# Patient Record
Sex: MALE | Race: WHITE | Employment: OTHER | URBAN - METROPOLITAN AREA
[De-identification: names, ages, dates, MRNs, and addresses within clinical notes are randomized per-mention and may not be internally consistent; named-entity substitution may affect disease eponyms.]

---

## 2018-09-23 ENCOUNTER — APPOINTMENT (OUTPATIENT)
Dept: CT IMAGING | Age: 83
End: 2018-09-23
Payer: MEDICARE

## 2018-09-23 ENCOUNTER — HOSPITAL ENCOUNTER (EMERGENCY)
Age: 83
Discharge: HOME | End: 2018-09-23
Attending: EMERGENCY MEDICINE
Payer: MEDICARE

## 2018-09-23 VITALS
WEIGHT: 197.2 LBS | RESPIRATION RATE: 18 BRPM | DIASTOLIC BLOOD PRESSURE: 88 MMHG | TEMPERATURE: 98 F | HEART RATE: 80 BPM | SYSTOLIC BLOOD PRESSURE: 126 MMHG | OXYGEN SATURATION: 92 %

## 2018-09-23 DIAGNOSIS — M54.50 ACUTE EXACERBATION OF CHRONIC LOW BACK PAIN: Primary | ICD-10-CM

## 2018-09-23 DIAGNOSIS — G89.29 ACUTE EXACERBATION OF CHRONIC LOW BACK PAIN: Primary | ICD-10-CM

## 2018-09-23 LAB
ANION GAP SERPL CALCULATED.3IONS-SCNC: 15 MMOL/L (ref 3–16)
BASOPHILS ABSOLUTE: 0 K/UL (ref 0–0.2)
BASOPHILS RELATIVE PERCENT: 0.5 %
BILIRUBIN URINE: NEGATIVE
BLOOD, URINE: NEGATIVE
BUN BLDV-MCNC: 23 MG/DL (ref 7–20)
CALCIUM SERPL-MCNC: 9.6 MG/DL (ref 8.3–10.6)
CHLORIDE BLD-SCNC: 113 MMOL/L (ref 99–110)
CLARITY: CLEAR
CO2: 21 MMOL/L (ref 21–32)
COLOR: YELLOW
CREAT SERPL-MCNC: 1.2 MG/DL (ref 0.8–1.3)
EOSINOPHILS ABSOLUTE: 0.1 K/UL (ref 0–0.6)
EOSINOPHILS RELATIVE PERCENT: 0.7 %
GFR AFRICAN AMERICAN: >60
GFR NON-AFRICAN AMERICAN: 57
GLUCOSE BLD-MCNC: 108 MG/DL (ref 70–99)
GLUCOSE URINE: NEGATIVE MG/DL
HCT VFR BLD CALC: 47.4 % (ref 40.5–52.5)
HEMOGLOBIN: 15.6 G/DL (ref 13.5–17.5)
KETONES, URINE: NEGATIVE MG/DL
LEUKOCYTE ESTERASE, URINE: NEGATIVE
LYMPHOCYTES ABSOLUTE: 1 K/UL (ref 1–5.1)
LYMPHOCYTES RELATIVE PERCENT: 8.9 %
MCH RBC QN AUTO: 32.5 PG (ref 26–34)
MCHC RBC AUTO-ENTMCNC: 33 G/DL (ref 31–36)
MCV RBC AUTO: 98.4 FL (ref 80–100)
MICROSCOPIC EXAMINATION: NORMAL
MONOCYTES ABSOLUTE: 1 K/UL (ref 0–1.3)
MONOCYTES RELATIVE PERCENT: 9.2 %
NEUTROPHILS ABSOLUTE: 8.7 K/UL (ref 1.7–7.7)
NEUTROPHILS RELATIVE PERCENT: 80.7 %
NITRITE, URINE: NEGATIVE
PDW BLD-RTO: 12.6 % (ref 12.4–15.4)
PH UA: 5
PLATELET # BLD: 215 K/UL (ref 135–450)
PMV BLD AUTO: 8.3 FL (ref 5–10.5)
POTASSIUM SERPL-SCNC: 4 MMOL/L (ref 3.5–5.1)
PROTEIN UA: NEGATIVE MG/DL
RBC # BLD: 4.82 M/UL (ref 4.2–5.9)
REASON FOR REJECTION: NORMAL
REJECTED TEST: NORMAL
SODIUM BLD-SCNC: 149 MMOL/L (ref 136–145)
SPECIFIC GRAVITY UA: 1.02
URINE REFLEX TO CULTURE: NORMAL
URINE TYPE: NORMAL
UROBILINOGEN, URINE: 1 E.U./DL
WBC # BLD: 10.7 K/UL (ref 4–11)

## 2018-09-23 PROCEDURE — 81003 URINALYSIS AUTO W/O SCOPE: CPT

## 2018-09-23 PROCEDURE — 96374 THER/PROPH/DIAG INJ IV PUSH: CPT

## 2018-09-23 PROCEDURE — 99284 EMERGENCY DEPT VISIT MOD MDM: CPT

## 2018-09-23 PROCEDURE — 96375 TX/PRO/DX INJ NEW DRUG ADDON: CPT

## 2018-09-23 PROCEDURE — 96376 TX/PRO/DX INJ SAME DRUG ADON: CPT

## 2018-09-23 PROCEDURE — 80048 BASIC METABOLIC PNL TOTAL CA: CPT

## 2018-09-23 PROCEDURE — 6360000002 HC RX W HCPCS: Performed by: PHYSICIAN ASSISTANT

## 2018-09-23 PROCEDURE — 85025 COMPLETE CBC W/AUTO DIFF WBC: CPT

## 2018-09-23 PROCEDURE — 72131 CT LUMBAR SPINE W/O DYE: CPT

## 2018-09-23 RX ORDER — MORPHINE SULFATE 4 MG/ML
4 INJECTION, SOLUTION INTRAMUSCULAR; INTRAVENOUS ONCE
Status: COMPLETED | OUTPATIENT
Start: 2018-09-23 | End: 2018-09-23

## 2018-09-23 RX ORDER — MORPHINE SULFATE 4 MG/ML
4 INJECTION, SOLUTION INTRAMUSCULAR; INTRAVENOUS ONCE
Status: DISCONTINUED | OUTPATIENT
Start: 2018-09-23 | End: 2018-09-23

## 2018-09-23 RX ORDER — HYDROCODONE BITARTRATE AND ACETAMINOPHEN 5; 325 MG/1; MG/1
1 TABLET ORAL EVERY 6 HOURS PRN
Qty: 12 TABLET | Refills: 0 | Status: SHIPPED | OUTPATIENT
Start: 2018-09-23 | End: 2018-09-26

## 2018-09-23 RX ORDER — ONDANSETRON 2 MG/ML
4 INJECTION INTRAMUSCULAR; INTRAVENOUS ONCE
Status: COMPLETED | OUTPATIENT
Start: 2018-09-23 | End: 2018-09-23

## 2018-09-23 RX ORDER — MORPHINE SULFATE 2 MG/ML
2 INJECTION, SOLUTION INTRAMUSCULAR; INTRAVENOUS ONCE
Status: COMPLETED | OUTPATIENT
Start: 2018-09-23 | End: 2018-09-23

## 2018-09-23 RX ADMIN — MORPHINE SULFATE 2 MG: 2 INJECTION, SOLUTION INTRAMUSCULAR; INTRAVENOUS at 10:29

## 2018-09-23 RX ADMIN — ONDANSETRON 4 MG: 2 INJECTION INTRAMUSCULAR; INTRAVENOUS at 10:29

## 2018-09-23 RX ADMIN — MORPHINE SULFATE 4 MG: 4 INJECTION INTRAVENOUS at 15:03

## 2018-09-23 ASSESSMENT — PAIN SCALES - GENERAL
PAINLEVEL_OUTOF10: 5
PAINLEVEL_OUTOF10: 5

## 2018-09-23 NOTE — ED PROVIDER NOTES
Degenerative changes at L3-4 and L4-5 contributing to mild spinal canal   stenosis. 3. Subtle soft tissue stranding in the retroperitoneal fat adjacent to the   distal aorta. There is no evidence of wall thickening, aneurysm or rupture. This is a nonspecific finding which can be seen with atherosclerotic disease. Giant cell arteritis would be considered less likely. Discitis would also be   considered unlikely with no known history of infection. LABS:  Labs Reviewed   BASIC METABOLIC PANEL - Abnormal; Notable for the following:        Result Value    Sodium 149 (*)     Chloride 113 (*)     Glucose 108 (*)     BUN 23 (*)     GFR Non- 57 (*)     All other components within normal limits    Narrative:     Performed at:  [facility]  [address] [facility] [place] [address]   Phone [phone]   CBC WITH AUTO DIFFERENTIAL - Abnormal; Notable for the following:     Neutrophils # 8.7 (*)     All other components within normal limits    Narrative:     Performed at:  [facility]  [address] [facility] [place] [address]   Phone [phone]   URINE RT REFLEX TO CULTURE    Narrative:     Performed at:  [facility]  [address] [facility] [place] [address]   Phone [phone]   SPECIMEN REJECTION    Narrative:     Performed at:  [facility]  [address] [facility] [place] [address]   Phone [phone]       All other labs were within normal range or not returned as of this dictation. EMERGENCY DEPARTMENT COURSE and DIFFERENTIAL DIAGNOSIS/MDM:   Vitals:    Vitals:    09/23/18 0917   BP: (!) 156/79   Pulse: 93   Resp: 18   Temp: 98.9 °F (37.2 °C)   TempSrc: Oral   SpO2: 97%   Weight: 197 lb 3.2 oz (89.4 kg)       I saw this patient with Dr. Yun Patel who spent face-to-face time with the patient and agreed with my assessment and plan.   The patient [address] [facility]  [phone]    As needed, If symptoms worsen      DISCHARGE MEDICATIONS:  New Prescriptions    HYDROCODONE-ACETAMINOPHEN (NORCO) 5-325 MG PER TABLET    Take 1 tablet by mouth every 6 hours as needed for Pain for up to 3 days. . Take lowest dose possible to manage pain.        (Please note that portions of this note were completed with a voice recognition program.  Efforts were made to edit the dictations but occasionally words are mis-transcribed.)    DECLAN Awad PA-C  09/23/18 7235

## 2018-09-23 NOTE — ED NOTES
D/C: Order noted for d/c. Pt confirmed d/c paperwork  have correct name. Discharge and education instructions reviewed with patient. Teach-back successful. Pt verbalized understanding and signed d/c papers. Pt denied questions at this time. No acute distress noted. Patient instructed to follow-up as noted - return to emergency department if symptoms worsen. Patient verbalized understanding. Discharged per EDMD with discharge instructions. Pt discharged per to private vehicle. Patient stable upon departure. Thanked patient for choosing [facility] for care.           Yao Michel, RN  09/23/18 3089

## 2018-09-23 NOTE — ED NOTES
Pt straight cathed for urine sample pt tolerated well pt depend saturated pt cleansed and changed at this time pt now clean and dry.       Femi Simpson RN  09/23/18 2746

## 2018-09-23 NOTE — ED NOTES
Pt and wife are new residents to [facility]. Aide came in today and states unable to get pt out of bed. Pt aide from [facility] at bedside states pt may need more skilled care.       Rut Gordon RN  09/23/18 7802

## 2018-09-23 NOTE — ED NOTES
Spoke with lab again at this time about lab draw needed in room 8.        Femi Simpson RN  09/23/18 8076

## 2019-12-09 ENCOUNTER — HOSPITAL ENCOUNTER (EMERGENCY)
Age: 84
Discharge: HOME | End: 2019-12-10
Attending: EMERGENCY MEDICINE
Payer: MEDICARE

## 2019-12-09 ENCOUNTER — APPOINTMENT (OUTPATIENT)
Dept: GENERAL RADIOLOGY | Age: 84
End: 2019-12-09
Payer: MEDICARE

## 2019-12-09 VITALS
RESPIRATION RATE: 19 BRPM | HEART RATE: 71 BPM | SYSTOLIC BLOOD PRESSURE: 126 MMHG | DIASTOLIC BLOOD PRESSURE: 72 MMHG | TEMPERATURE: 97.8 F | OXYGEN SATURATION: 86 % | WEIGHT: 176.37 LBS

## 2019-12-09 DIAGNOSIS — R55 SYNCOPE AND COLLAPSE: Primary | ICD-10-CM

## 2019-12-09 LAB
A/G RATIO: 1.4 (ref 1.1–2.2)
ALBUMIN SERPL-MCNC: 3.6 G/DL (ref 3.4–5)
ALP BLD-CCNC: 72 U/L (ref 40–129)
ALT SERPL-CCNC: 8 U/L (ref 10–40)
ANION GAP SERPL CALCULATED.3IONS-SCNC: 11 MMOL/L (ref 3–16)
AST SERPL-CCNC: 14 U/L (ref 15–37)
BASOPHILS ABSOLUTE: 0 K/UL (ref 0–0.2)
BASOPHILS RELATIVE PERCENT: 0.9 %
BILIRUB SERPL-MCNC: 0.5 MG/DL (ref 0–1)
BUN BLDV-MCNC: 21 MG/DL (ref 7–20)
CALCIUM SERPL-MCNC: 9.5 MG/DL (ref 8.3–10.6)
CHLORIDE BLD-SCNC: 101 MMOL/L (ref 99–110)
CO2: 24 MMOL/L (ref 21–32)
CREAT SERPL-MCNC: 1.1 MG/DL (ref 0.8–1.3)
EKG ATRIAL RATE: 66 BPM
EKG DIAGNOSIS: NORMAL
EKG P AXIS: 46 DEGREES
EKG P-R INTERVAL: 190 MS
EKG Q-T INTERVAL: 406 MS
EKG QRS DURATION: 100 MS
EKG QTC CALCULATION (BAZETT): 425 MS
EKG R AXIS: -21 DEGREES
EKG T AXIS: 92 DEGREES
EKG VENTRICULAR RATE: 66 BPM
EOSINOPHILS ABSOLUTE: 0.2 K/UL (ref 0–0.6)
EOSINOPHILS RELATIVE PERCENT: 3.8 %
GFR AFRICAN AMERICAN: >60
GFR NON-AFRICAN AMERICAN: >60
GLOBULIN: 2.6 G/DL
GLUCOSE BLD-MCNC: 104 MG/DL (ref 70–99)
HCT VFR BLD CALC: 40.3 % (ref 40.5–52.5)
HEMOGLOBIN: 13.6 G/DL (ref 13.5–17.5)
LYMPHOCYTES ABSOLUTE: 0.9 K/UL (ref 1–5.1)
LYMPHOCYTES RELATIVE PERCENT: 18.1 %
MCH RBC QN AUTO: 32.6 PG (ref 26–34)
MCHC RBC AUTO-ENTMCNC: 33.8 G/DL (ref 31–36)
MCV RBC AUTO: 96.4 FL (ref 80–100)
MONOCYTES ABSOLUTE: 0.5 K/UL (ref 0–1.3)
MONOCYTES RELATIVE PERCENT: 9.6 %
NEUTROPHILS ABSOLUTE: 3.3 K/UL (ref 1.7–7.7)
NEUTROPHILS RELATIVE PERCENT: 67.6 %
PDW BLD-RTO: 12.7 % (ref 12.4–15.4)
PLATELET # BLD: 183 K/UL (ref 135–450)
PMV BLD AUTO: 8 FL (ref 5–10.5)
POTASSIUM SERPL-SCNC: 4.1 MMOL/L (ref 3.5–5.1)
PRO-BNP: 291 PG/ML (ref 0–449)
RBC # BLD: 4.18 M/UL (ref 4.2–5.9)
SODIUM BLD-SCNC: 136 MMOL/L (ref 136–145)
TOTAL PROTEIN: 6.2 G/DL (ref 6.4–8.2)
TROPONIN: <0.01 NG/ML
WBC # BLD: 4.8 K/UL (ref 4–11)

## 2019-12-09 PROCEDURE — 85025 COMPLETE CBC W/AUTO DIFF WBC: CPT

## 2019-12-09 PROCEDURE — 84484 ASSAY OF TROPONIN QUANT: CPT

## 2019-12-09 PROCEDURE — 93010 ELECTROCARDIOGRAM REPORT: CPT | Performed by: INTERNAL MEDICINE

## 2019-12-09 PROCEDURE — 83880 ASSAY OF NATRIURETIC PEPTIDE: CPT

## 2019-12-09 PROCEDURE — 36415 COLL VENOUS BLD VENIPUNCTURE: CPT

## 2019-12-09 PROCEDURE — 93005 ELECTROCARDIOGRAM TRACING: CPT | Performed by: EMERGENCY MEDICINE

## 2019-12-09 PROCEDURE — 99284 EMERGENCY DEPT VISIT MOD MDM: CPT

## 2019-12-09 PROCEDURE — 80053 COMPREHEN METABOLIC PANEL: CPT

## 2019-12-09 PROCEDURE — 71046 X-RAY EXAM CHEST 2 VIEWS: CPT

## 2019-12-09 ASSESSMENT — PAIN SCALES - PAIN ASSESSMENT IN ADVANCED DEMENTIA (PAINAD)
NEGVOCALIZATION: 0
FACIALEXPRESSION: 0
BODYLANGUAGE: 0
TOTALSCORE: 0
BREATHING: 0
CONSOLABILITY: 0

## 2019-12-10 VITALS
SYSTOLIC BLOOD PRESSURE: 107 MMHG | OXYGEN SATURATION: 99 % | RESPIRATION RATE: 14 BRPM | TEMPERATURE: 97 F | HEART RATE: 62 BPM | DIASTOLIC BLOOD PRESSURE: 64 MMHG

## 2019-12-10 DIAGNOSIS — Z00.8 ENCOUNTER FOR MEDICAL ASSESSMENT: Primary | ICD-10-CM

## 2019-12-10 PROCEDURE — 99281 EMR DPT VST MAYX REQ PHY/QHP: CPT

## 2019-12-10 ASSESSMENT — ENCOUNTER SYMPTOMS
WHEEZING: 0
SHORTNESS OF BREATH: 0
STRIDOR: 0
APNEA: 0
CHEST TIGHTNESS: 0
COLOR CHANGE: 0
VOMITING: 0
CHOKING: 0
COUGH: 0

## 2019-12-10 ASSESSMENT — PAIN SCALES - GENERAL
PAINLEVEL_OUTOF10: 0
PAINLEVEL_OUTOF10: 0

## 2019-12-23 ENCOUNTER — APPOINTMENT (OUTPATIENT)
Dept: GENERAL RADIOLOGY | Age: 84
End: 2019-12-23
Payer: MEDICARE

## 2019-12-23 ENCOUNTER — HOSPITAL ENCOUNTER (OUTPATIENT)
Age: 84
Setting detail: OBSERVATION
Discharge: NURSING, REHAB OR LONG-TERM CARE | End: 2019-12-24
Attending: EMERGENCY MEDICINE | Admitting: INTERNAL MEDICINE
Payer: MEDICARE

## 2019-12-23 ENCOUNTER — APPOINTMENT (OUTPATIENT)
Dept: CT IMAGING | Age: 84
End: 2019-12-23
Payer: MEDICARE

## 2019-12-23 DIAGNOSIS — R56.9 SEIZURE (HCC): Primary | ICD-10-CM

## 2019-12-23 PROBLEM — R41.82 AMS (ALTERED MENTAL STATUS): Status: ACTIVE | Noted: 2019-12-23

## 2019-12-23 LAB
A/G RATIO: 1.3 (ref 1.1–2.2)
ALBUMIN SERPL-MCNC: 3.5 G/DL (ref 3.4–5)
ALP BLD-CCNC: 77 U/L (ref 40–129)
ALT SERPL-CCNC: 10 U/L (ref 10–40)
ANION GAP SERPL CALCULATED.3IONS-SCNC: 17 MMOL/L (ref 3–16)
AST SERPL-CCNC: 19 U/L (ref 15–37)
BASOPHILS ABSOLUTE: 0 K/UL (ref 0–0.2)
BASOPHILS RELATIVE PERCENT: 0.7 %
BILIRUB SERPL-MCNC: 0.5 MG/DL (ref 0–1)
BILIRUBIN URINE: NEGATIVE
BLOOD, URINE: NEGATIVE
BUN BLDV-MCNC: 18 MG/DL (ref 7–20)
CALCIUM SERPL-MCNC: 9.6 MG/DL (ref 8.3–10.6)
CHLORIDE BLD-SCNC: 103 MMOL/L (ref 99–110)
CLARITY: CLEAR
CO2: 17 MMOL/L (ref 21–32)
COLOR: YELLOW
CREAT SERPL-MCNC: 1 MG/DL (ref 0.8–1.3)
EKG ATRIAL RATE: 74 BPM
EKG DIAGNOSIS: NORMAL
EKG P AXIS: 45 DEGREES
EKG P-R INTERVAL: 198 MS
EKG Q-T INTERVAL: 380 MS
EKG QRS DURATION: 102 MS
EKG QTC CALCULATION (BAZETT): 421 MS
EKG R AXIS: -20 DEGREES
EKG T AXIS: 99 DEGREES
EKG VENTRICULAR RATE: 74 BPM
EOSINOPHILS ABSOLUTE: 0.2 K/UL (ref 0–0.6)
EOSINOPHILS RELATIVE PERCENT: 3.5 %
GFR AFRICAN AMERICAN: >60
GFR NON-AFRICAN AMERICAN: >60
GLOBULIN: 2.7 G/DL
GLUCOSE BLD-MCNC: 109 MG/DL (ref 70–99)
GLUCOSE URINE: NEGATIVE MG/DL
HCT VFR BLD CALC: 41.2 % (ref 40.5–52.5)
HEMOGLOBIN: 13.8 G/DL (ref 13.5–17.5)
KETONES, URINE: NEGATIVE MG/DL
LEUKOCYTE ESTERASE, URINE: NEGATIVE
LYMPHOCYTES ABSOLUTE: 1.3 K/UL (ref 1–5.1)
LYMPHOCYTES RELATIVE PERCENT: 21 %
MCH RBC QN AUTO: 33 PG (ref 26–34)
MCHC RBC AUTO-ENTMCNC: 33.6 G/DL (ref 31–36)
MCV RBC AUTO: 98.4 FL (ref 80–100)
MICROSCOPIC EXAMINATION: NORMAL
MONOCYTES ABSOLUTE: 0.5 K/UL (ref 0–1.3)
MONOCYTES RELATIVE PERCENT: 8.3 %
NEUTROPHILS ABSOLUTE: 4.1 K/UL (ref 1.7–7.7)
NEUTROPHILS RELATIVE PERCENT: 66.5 %
NITRITE, URINE: NEGATIVE
PDW BLD-RTO: 12.9 % (ref 12.4–15.4)
PH UA: 7 (ref 5–8)
PLATELET # BLD: 174 K/UL (ref 135–450)
PMV BLD AUTO: 8 FL (ref 5–10.5)
POTASSIUM REFLEX MAGNESIUM: 4.2 MMOL/L (ref 3.5–5.1)
PROTEIN UA: NEGATIVE MG/DL
RBC # BLD: 4.19 M/UL (ref 4.2–5.9)
SODIUM BLD-SCNC: 137 MMOL/L (ref 136–145)
SPECIFIC GRAVITY UA: 1.02 (ref 1–1.03)
TOTAL PROTEIN: 6.2 G/DL (ref 6.4–8.2)
TROPONIN: <0.01 NG/ML
URINE REFLEX TO CULTURE: NORMAL
URINE TYPE: NORMAL
UROBILINOGEN, URINE: 1 E.U./DL
WBC # BLD: 6.1 K/UL (ref 4–11)

## 2019-12-23 PROCEDURE — 97162 PT EVAL MOD COMPLEX 30 MIN: CPT

## 2019-12-23 PROCEDURE — 6360000002 HC RX W HCPCS: Performed by: INTERNAL MEDICINE

## 2019-12-23 PROCEDURE — 99285 EMERGENCY DEPT VISIT HI MDM: CPT

## 2019-12-23 PROCEDURE — 93010 ELECTROCARDIOGRAM REPORT: CPT | Performed by: INTERNAL MEDICINE

## 2019-12-23 PROCEDURE — 84484 ASSAY OF TROPONIN QUANT: CPT

## 2019-12-23 PROCEDURE — 81003 URINALYSIS AUTO W/O SCOPE: CPT

## 2019-12-23 PROCEDURE — 2580000003 HC RX 258: Performed by: EMERGENCY MEDICINE

## 2019-12-23 PROCEDURE — 96361 HYDRATE IV INFUSION ADD-ON: CPT

## 2019-12-23 PROCEDURE — G0378 HOSPITAL OBSERVATION PER HR: HCPCS

## 2019-12-23 PROCEDURE — 71045 X-RAY EXAM CHEST 1 VIEW: CPT

## 2019-12-23 PROCEDURE — 6370000000 HC RX 637 (ALT 250 FOR IP): Performed by: NURSE PRACTITIONER

## 2019-12-23 PROCEDURE — 96360 HYDRATION IV INFUSION INIT: CPT

## 2019-12-23 PROCEDURE — 96372 THER/PROPH/DIAG INJ SC/IM: CPT

## 2019-12-23 PROCEDURE — 70450 CT HEAD/BRAIN W/O DYE: CPT

## 2019-12-23 PROCEDURE — 2580000003 HC RX 258: Performed by: INTERNAL MEDICINE

## 2019-12-23 PROCEDURE — 80053 COMPREHEN METABOLIC PANEL: CPT

## 2019-12-23 PROCEDURE — 85025 COMPLETE CBC W/AUTO DIFF WBC: CPT

## 2019-12-23 PROCEDURE — 93005 ELECTROCARDIOGRAM TRACING: CPT | Performed by: EMERGENCY MEDICINE

## 2019-12-23 PROCEDURE — 97116 GAIT TRAINING THERAPY: CPT

## 2019-12-23 RX ORDER — FINASTERIDE 5 MG/1
5 TABLET, FILM COATED ORAL DAILY
COMMUNITY

## 2019-12-23 RX ORDER — LEVETIRACETAM 500 MG/1
500 TABLET ORAL 2 TIMES DAILY
Status: DISCONTINUED | OUTPATIENT
Start: 2019-12-23 | End: 2019-12-24 | Stop reason: HOSPADM

## 2019-12-23 RX ORDER — SODIUM CHLORIDE 0.9 % (FLUSH) 0.9 %
10 SYRINGE (ML) INJECTION PRN
Status: DISCONTINUED | OUTPATIENT
Start: 2019-12-23 | End: 2019-12-24 | Stop reason: HOSPADM

## 2019-12-23 RX ORDER — ACETAMINOPHEN 325 MG/1
650 TABLET ORAL EVERY 4 HOURS PRN
Status: DISCONTINUED | OUTPATIENT
Start: 2019-12-23 | End: 2019-12-24 | Stop reason: HOSPADM

## 2019-12-23 RX ORDER — PRAVASTATIN SODIUM 40 MG
40 TABLET ORAL NIGHTLY
COMMUNITY

## 2019-12-23 RX ORDER — POLYETHYLENE GLYCOL 3350 17 G/17G
17 POWDER, FOR SOLUTION ORAL 2 TIMES DAILY PRN
COMMUNITY

## 2019-12-23 RX ORDER — SENNA AND DOCUSATE SODIUM 50; 8.6 MG/1; MG/1
1 TABLET, FILM COATED ORAL DAILY
COMMUNITY
End: 2020-10-12

## 2019-12-23 RX ORDER — ONDANSETRON 2 MG/ML
4 INJECTION INTRAMUSCULAR; INTRAVENOUS EVERY 6 HOURS PRN
Status: DISCONTINUED | OUTPATIENT
Start: 2019-12-23 | End: 2019-12-24 | Stop reason: HOSPADM

## 2019-12-23 RX ORDER — SODIUM CHLORIDE 0.9 % (FLUSH) 0.9 %
10 SYRINGE (ML) INJECTION EVERY 12 HOURS SCHEDULED
Status: DISCONTINUED | OUTPATIENT
Start: 2019-12-23 | End: 2019-12-24 | Stop reason: HOSPADM

## 2019-12-23 RX ORDER — SODIUM CHLORIDE 9 MG/ML
1000 INJECTION, SOLUTION INTRAVENOUS CONTINUOUS
Status: DISCONTINUED | OUTPATIENT
Start: 2019-12-23 | End: 2019-12-23

## 2019-12-23 RX ORDER — LEVOTHYROXINE SODIUM 175 UG/1
175 TABLET ORAL DAILY
COMMUNITY

## 2019-12-23 RX ADMIN — SODIUM CHLORIDE 1000 ML: 9 INJECTION, SOLUTION INTRAVENOUS at 09:33

## 2019-12-23 RX ADMIN — ENOXAPARIN SODIUM 40 MG: 40 INJECTION SUBCUTANEOUS at 21:17

## 2019-12-23 RX ADMIN — LEVETIRACETAM 500 MG: 500 TABLET ORAL at 21:17

## 2019-12-23 RX ADMIN — SODIUM CHLORIDE, PRESERVATIVE FREE 10 ML: 5 INJECTION INTRAVENOUS at 21:18

## 2019-12-23 ASSESSMENT — PAIN SCALES - PAIN ASSESSMENT IN ADVANCED DEMENTIA (PAINAD)
BODYLANGUAGE: 0
CONSOLABILITY: 0
BREATHING: 0
CONSOLABILITY: 0
BODYLANGUAGE: 0
BREATHING: 0
FACIALEXPRESSION: 0
TOTALSCORE: 0
FACIALEXPRESSION: 0
TOTALSCORE: 1
BODYLANGUAGE: 0
TOTALSCORE: 0
BODYLANGUAGE: 1
BREATHING: 0
NEGVOCALIZATION: 0
CONSOLABILITY: 0
TOTALSCORE: 1
CONSOLABILITY: 0
BREATHING: 0
FACIALEXPRESSION: 0
NEGVOCALIZATION: 0
NEGVOCALIZATION: 0
NEGVOCALIZATION: 1
BREATHING: 0
CONSOLABILITY: 0
TOTALSCORE: 0
NEGVOCALIZATION: 0
BODYLANGUAGE: 0
FACIALEXPRESSION: 0
FACIALEXPRESSION: 0

## 2019-12-23 ASSESSMENT — PAIN SCALES - GENERAL: PAINLEVEL_OUTOF10: 1

## 2019-12-23 ASSESSMENT — ENCOUNTER SYMPTOMS
DIARRHEA: 0
NAUSEA: 0
EYE PAIN: 0
SHORTNESS OF BREATH: 0
ABDOMINAL PAIN: 0
EYE DISCHARGE: 0
RHINORRHEA: 0
SORE THROAT: 0
COUGH: 0
VOMITING: 0
BACK PAIN: 0
EYE REDNESS: 0
WHEEZING: 0

## 2019-12-24 VITALS
HEART RATE: 73 BPM | WEIGHT: 176.15 LBS | DIASTOLIC BLOOD PRESSURE: 69 MMHG | RESPIRATION RATE: 12 BRPM | SYSTOLIC BLOOD PRESSURE: 118 MMHG | TEMPERATURE: 97.5 F | HEIGHT: 72 IN | BODY MASS INDEX: 23.86 KG/M2 | OXYGEN SATURATION: 97 %

## 2019-12-24 PROCEDURE — 2580000003 HC RX 258: Performed by: INTERNAL MEDICINE

## 2019-12-24 PROCEDURE — G0378 HOSPITAL OBSERVATION PER HR: HCPCS

## 2019-12-24 PROCEDURE — 96372 THER/PROPH/DIAG INJ SC/IM: CPT

## 2019-12-24 PROCEDURE — 6370000000 HC RX 637 (ALT 250 FOR IP): Performed by: NURSE PRACTITIONER

## 2019-12-24 PROCEDURE — 6360000002 HC RX W HCPCS: Performed by: INTERNAL MEDICINE

## 2019-12-24 RX ORDER — LEVETIRACETAM 500 MG/1
500 TABLET ORAL 2 TIMES DAILY
Qty: 60 TABLET | Refills: 3 | Status: SHIPPED | OUTPATIENT
Start: 2019-12-24 | End: 2020-10-12

## 2019-12-24 RX ADMIN — SODIUM CHLORIDE, PRESERVATIVE FREE 10 ML: 5 INJECTION INTRAVENOUS at 11:54

## 2019-12-24 RX ADMIN — ENOXAPARIN SODIUM 40 MG: 40 INJECTION SUBCUTANEOUS at 11:53

## 2019-12-24 RX ADMIN — LEVETIRACETAM 500 MG: 500 TABLET ORAL at 11:53

## 2019-12-24 ASSESSMENT — PAIN SCALES - PAIN ASSESSMENT IN ADVANCED DEMENTIA (PAINAD)
BODYLANGUAGE: 0
NEGVOCALIZATION: 0
TOTALSCORE: 0
CONSOLABILITY: 0
CONSOLABILITY: 0
TOTALSCORE: 0
BREATHING: 0
TOTALSCORE: 0
TOTALSCORE: 0
FACIALEXPRESSION: 0
BREATHING: 0
BREATHING: 0
BODYLANGUAGE: 0
CONSOLABILITY: 0
FACIALEXPRESSION: 0
BODYLANGUAGE: 0
TOTALSCORE: 0
BREATHING: 0
FACIALEXPRESSION: 0
BODYLANGUAGE: 0
BREATHING: 0
BREATHING: 0
NEGVOCALIZATION: 0
NEGVOCALIZATION: 0
TOTALSCORE: 0
FACIALEXPRESSION: 0
CONSOLABILITY: 0
BREATHING: 0
FACIALEXPRESSION: 0
NEGVOCALIZATION: 0
FACIALEXPRESSION: 0
CONSOLABILITY: 0
NEGVOCALIZATION: 0
CONSOLABILITY: 0
FACIALEXPRESSION: 0
BODYLANGUAGE: 0
NEGVOCALIZATION: 0
NEGVOCALIZATION: 0
BODYLANGUAGE: 0
TOTALSCORE: 0
TOTALSCORE: 0
NEGVOCALIZATION: 0
FACIALEXPRESSION: 0
BREATHING: 0
CONSOLABILITY: 0
CONSOLABILITY: 0
BODYLANGUAGE: 0
BODYLANGUAGE: 0

## 2019-12-24 ASSESSMENT — PAIN SCALES - WONG BAKER
WONGBAKER_NUMERICALRESPONSE: 0
WONGBAKER_NUMERICALRESPONSE: 0

## 2019-12-24 ASSESSMENT — PAIN SCALES - GENERAL: PAINLEVEL_OUTOF10: 0

## 2020-07-04 ENCOUNTER — APPOINTMENT (OUTPATIENT)
Dept: CT IMAGING | Age: 85
End: 2020-07-04
Payer: MEDICARE

## 2020-07-04 ENCOUNTER — HOSPITAL ENCOUNTER (EMERGENCY)
Age: 85
Discharge: HOME | End: 2020-07-04
Attending: STUDENT IN AN ORGANIZED HEALTH CARE EDUCATION/TRAINING PROGRAM
Payer: MEDICARE

## 2020-07-04 ENCOUNTER — APPOINTMENT (OUTPATIENT)
Dept: GENERAL RADIOLOGY | Age: 85
End: 2020-07-04
Payer: MEDICARE

## 2020-07-04 VITALS
SYSTOLIC BLOOD PRESSURE: 146 MMHG | DIASTOLIC BLOOD PRESSURE: 72 MMHG | HEIGHT: 72 IN | OXYGEN SATURATION: 98 % | BODY MASS INDEX: 24.78 KG/M2 | RESPIRATION RATE: 14 BRPM | WEIGHT: 182.98 LBS | TEMPERATURE: 97.3 F | HEART RATE: 69 BPM

## 2020-07-04 LAB
A/G RATIO: 1 (ref 1.1–2.2)
ALBUMIN SERPL-MCNC: 2.6 G/DL (ref 3.4–5)
ALP BLD-CCNC: 52 U/L (ref 40–129)
ALT SERPL-CCNC: 11 U/L (ref 10–40)
ANION GAP SERPL CALCULATED.3IONS-SCNC: 9 MMOL/L (ref 3–16)
AST SERPL-CCNC: 40 U/L (ref 15–37)
BASOPHILS ABSOLUTE: 0 K/UL (ref 0–0.2)
BASOPHILS RELATIVE PERCENT: 1 %
BILIRUB SERPL-MCNC: 0.4 MG/DL (ref 0–1)
BUN BLDV-MCNC: 19 MG/DL (ref 7–20)
CALCIUM SERPL-MCNC: 7.8 MG/DL (ref 8.3–10.6)
CHLORIDE BLD-SCNC: 105 MMOL/L (ref 99–110)
CO2: 16 MMOL/L (ref 21–32)
CREAT SERPL-MCNC: 1.1 MG/DL (ref 0.8–1.3)
EOSINOPHILS ABSOLUTE: 0.1 K/UL (ref 0–0.6)
EOSINOPHILS RELATIVE PERCENT: 2.3 %
GFR AFRICAN AMERICAN: >60
GFR NON-AFRICAN AMERICAN: >60
GLOBULIN: 2.7 G/DL
GLUCOSE BLD-MCNC: 119 MG/DL (ref 70–99)
HCT VFR BLD CALC: 36.7 % (ref 40.5–52.5)
HEMOGLOBIN: 12.3 G/DL (ref 13.5–17.5)
INR BLD: 1.06 (ref 0.86–1.14)
LYMPHOCYTES ABSOLUTE: 0.9 K/UL (ref 1–5.1)
LYMPHOCYTES RELATIVE PERCENT: 19.7 %
MCH RBC QN AUTO: 33 PG (ref 26–34)
MCHC RBC AUTO-ENTMCNC: 33.4 G/DL (ref 31–36)
MCV RBC AUTO: 98.8 FL (ref 80–100)
MONOCYTES ABSOLUTE: 0.4 K/UL (ref 0–1.3)
MONOCYTES RELATIVE PERCENT: 8.4 %
NEUTROPHILS ABSOLUTE: 3.1 K/UL (ref 1.7–7.7)
NEUTROPHILS RELATIVE PERCENT: 68.6 %
PDW BLD-RTO: 13.3 % (ref 12.4–15.4)
PLATELET # BLD: 160 K/UL (ref 135–450)
PMV BLD AUTO: 8.2 FL (ref 5–10.5)
POTASSIUM SERPL-SCNC: 4.7 MMOL/L (ref 3.5–5.1)
PROTHROMBIN TIME: 12.3 SEC (ref 10–13.2)
RBC # BLD: 3.71 M/UL (ref 4.2–5.9)
SODIUM BLD-SCNC: 130 MMOL/L (ref 136–145)
TOTAL PROTEIN: 5.3 G/DL (ref 6.4–8.2)
TROPONIN: <0.01 NG/ML
WBC # BLD: 4.5 K/UL (ref 4–11)

## 2020-07-04 PROCEDURE — 85025 COMPLETE CBC W/AUTO DIFF WBC: CPT

## 2020-07-04 PROCEDURE — 85610 PROTHROMBIN TIME: CPT

## 2020-07-04 PROCEDURE — 99284 EMERGENCY DEPT VISIT MOD MDM: CPT

## 2020-07-04 PROCEDURE — 84132 ASSAY OF SERUM POTASSIUM: CPT

## 2020-07-04 PROCEDURE — 80053 COMPREHEN METABOLIC PANEL: CPT

## 2020-07-04 PROCEDURE — 93005 ELECTROCARDIOGRAM TRACING: CPT | Performed by: STUDENT IN AN ORGANIZED HEALTH CARE EDUCATION/TRAINING PROGRAM

## 2020-07-04 PROCEDURE — 71045 X-RAY EXAM CHEST 1 VIEW: CPT

## 2020-07-04 PROCEDURE — 70498 CT ANGIOGRAPHY NECK: CPT

## 2020-07-04 PROCEDURE — 6360000004 HC RX CONTRAST MEDICATION: Performed by: STUDENT IN AN ORGANIZED HEALTH CARE EDUCATION/TRAINING PROGRAM

## 2020-07-04 PROCEDURE — 84484 ASSAY OF TROPONIN QUANT: CPT

## 2020-07-04 PROCEDURE — 70450 CT HEAD/BRAIN W/O DYE: CPT

## 2020-07-04 RX ADMIN — IOPAMIDOL 75 ML: 755 INJECTION, SOLUTION INTRAVENOUS at 10:18

## 2020-07-04 NOTE — ED NOTES
Bed: B-10  Expected date: 7/4/20  Expected time: 9:35 AM  Means of arrival: [facility]  Comments:  LOC     Kira Capone [facility]  07/04/20 8800

## 2020-07-04 NOTE — ED PROVIDER NOTES
Primary Care Physician: No primary care provider on file. Attending Physician: No att. providers found     History   Chief Complaint   Patient presents with    Loss of Consciousness     Patient was found down in bathroom at home. Squad reports patient was unresponsive on their arrival and had an initial bp of 60 systolic and initial pulse in the 40's. Pt was given approx 600 ml of normal saline in route to department. Patient is awake with eyes open but appears lethargic and is unable to answer any questions at this time. unknow what patients baseline is. Dr Cameron Mckeon at bedside to evaluate patient. HPI   Dae Vitale is a 80 y.o. male with history of Alzheimer, hypertension, hyperlipidemia, dysphagia now for 1 year, with multiple syncope events who lives in his assisted living presents this morning after was found unresponsive in his bedroom. Patient does not talk and would not provide history but per reports his blood pressures were in the low 60s heart rate of 40. Was given a liter of fluids with improvement. I was able to talk to the daughter who stated who provided this information. States that the patient lives with his wife in assisted living facility. Patient is DNI DNR per daughter. Past Medical History:   Diagnosis Date    Alzheimer disease ([facility] [address]     Heart murmur     Hyperlipidemia     Thyroid disease         History reviewed. No pertinent surgical history. History reviewed. No pertinent family history.      Social History     Socioeconomic History    Marital status:      Spouse name: None    Number of children: None    Years of education: None    Highest education level: None   Occupational History    None   Social Needs    Financial resource strain: None    Food insecurity     Worry: None     Inability: None    Transportation needs     Medical: None     Non-medical: None   Tobacco Use    Smoking status: Never Smoker    Smokeless tobacco: Never Used   Substance and Sexual Activity    Alcohol use: No    Drug use: None    Sexual activity: None   Lifestyle    Physical activity     Days per week: None     Minutes per session: None    Stress: None   Relationships    Social connections     Talks on phone: None     Gets together: None     Attends Faith service: None     Active member of club or organization: None     Attends meetings of clubs or organizations: None     Relationship status: None    Intimate partner violence     Fear of current or ex partner: None     Emotionally abused: None     Physically abused: None     Forced sexual activity: None   Other Topics Concern    None   Social History Narrative    None        Review of Systems   10 total systems reviewed and found to be negative unless otherwise noted in HPI     Physical Exam   BP (!) 146/72   Pulse 69   Temp 97.3 °F (36.3 °C) (Axillary)   Resp 14   Ht 6' (1.829 m)   Wt 182 lb 15.7 oz (83 kg)   SpO2 98%   BMI 24.82 kg/m²      CONSTITUTIONAL: A phasic and not following commands  HEAD: atraumatic, normocephalic   EYES: PERRL, No injection, discharge or scleral icterus. ENT: Moist mucous membranes. NECK: Normal ROM, NO LAD   CARDIOVASCULAR: Regular rate and rhythm. No murmurs or gallop. PULMONARY/CHEST: Airway patent. No retractions. Breath sounds clear with good air entry bilaterally. ABDOMEN: Soft, Non-distended  SKIN: Acyanotic, warm, dry   MUSCULOSKELETAL: No swelling, tenderness or deformity   NEUROLOGICAL: Awake and alert but not answering questions pulses intact. Grossly nonfocal   Nursing note and vitals reviewed.      ED Course & Medical Decision Making   Medications   iopamidol (ISOVUE-370) 76 % injection 75 mL (75 mLs Intravenous Given 7/4/20 1018)      Labs Reviewed   CBC WITH AUTO DIFFERENTIAL - Abnormal; Notable for the following components:       Result Value    RBC 3.71 (*)     Hemoglobin 12.3 (*)     Hematocrit 36.7 (*)     Lymphocytes Absolute 0.9 (*)     All other components within normal limits    Narrative:     Performed at:  [facility]  [address] [place] [address]   Phone [phone]   COMPREHENSIVE METABOLIC PANEL W/ REFLEX TO MG FOR LOW K - Abnormal; Notable for the following components:    Sodium 130 (*)     CO2 16 (*)     Glucose 119 (*)     Calcium 7.8 (*)     Total Protein 5.3 (*)     Alb 2.6 (*)     Albumin/Globulin Ratio 1.0 (*)     AST 40 (*)     All other components within normal limits    Narrative:     Performed at:  [facility]  [address] [facility] [address] [place] [employer] [address]   Phone [phone]   TROPONIN    Narrative:     Performed at:  [facility]  [address] [facility] [place] [employer] [employer] [address]   Phone [phone]   PROTIME-INR    Narrative:     Performed at:  [facility]  [address] [facility] [place] [employer] [place] [employer] [address]   Phone [phone]   POTASSIUM    Narrative:     Performed at:  [facility]  [address] [facility] [place] [employer] [address]   Phone [phone]   POCT GLUCOSE      XR CHEST PORTABLE   Final Result   Minimal streaky bibasilar atelectasis. No other significant abnormality. CTA HEAD NECK W CONTRAST   Final Result   1. No acute intracranial abnormality. 2. No large vessel occlusion. 3. Severe chronic frontotemporal atrophy which can be seen with   frontotemporal dementia. Critical results were called by Dr. Dimitry Dalton MD to [address] [place] [address]   on 7/4/2020 at 10:35am.         CT HEAD WO CONTRAST   Final Result   1. No acute intracranial abnormality. 2. No large vessel occlusion. 3. Severe chronic frontotemporal atrophy which can be seen with   frontotemporal dementia.    Critical results were called by Dr. Dimitry Datlon MD to [address] [place] [address]   on 7/4/2020 at 10:35am.            EKG INTERPRETATION:  EKG by my preliminary interpretation shows sinus rhythm with rate of 64, normal axis, normal intervals, with no ST changes indicative of ischemia at this time. PROCEDURES:   Procedures    ASSESSMENT AND PLAN:  Christal Smith is a 80 y.o. male who presents today after a syncope event at his house. Exam patient was a phasic and not responding to any questions. When these findings and his presentation and no further history or information I did activate stroke alert for his aphasia. Labs and scans were obtained and showed no acute findings. During work-up I did contact the patient's daughter who stated to me that patient has been aphasic for 1 year and has had multiple syncope events in the past.  At this time, I informed the stroke team and did withdrew my activation. At this patient's daughter, also contacted the charge nurse and informed her that patient is 80years old and has had syncopes in the past as well as is a phasic and daughters no indication for further testing or admission. Patient was discharged home with family in stable condition. ClINICAL IMPRESSION:  1.  Syncope and collapse          PATIENT REFERRED TO:  [facility]) Pre-Services  [phone]          DISCHARGE MEDICATIONS:  Discharge Medication List as of 7/4/2020  1:02 PM        DISCONTINUED MEDICATIONS:  Discharge Medication List as of 7/4/2020  1:02 PM        DISPOSITION    Discharge home  Estephanie Goodson MD (electronically signed)  7/4/2020  _________________________________________________________________________________________  Amount and/or Complexity of Data Reviewed:  Clinical lab tests: ordered and reviewed   Tests in the radiology section of CPT®: ordered and reviewed   Tests in the medicine section of CPT®: ordered and reviewed   Decide to obtain previous medical records or to obtain history from someone other than the patient: yes  Obtain history from someone other than the patient: yes  Review and summarize past medical records:yes  I looked up the patient in our electronic medical record:yes  Discuss the patient with other providers:yes  Independent visualization of images, tracings, or specimens:yes  Risk of Complications, Morbidity, and/or Mortality:Moderate  Presenting problems: moderate Diagnostic procedures: moderate yes Management options: moderate     Critical Care Attestation   Total critical care time: 15 minutes minimum   Critical care time does not include separately billable procedures and treating other patients. Critical care was necessary to treat or prevent imminent or life-threatening deterioration of the following conditions: Aphasia, stroke protocol activated and case discussed with consultants.       _________________________________________________________________________________________  This record is transcribed utilizing voice recognition technology. There are inherent limitations in this technology. In addition, there may be limitations in editing of this report. If there are any discrepancies, please contact me directly.         Haresh Alonso MD  07/04/20 0410

## 2020-07-06 LAB
EKG ATRIAL RATE: 64 BPM
EKG DIAGNOSIS: NORMAL
EKG P AXIS: 42 DEGREES
EKG P-R INTERVAL: 224 MS
EKG Q-T INTERVAL: 428 MS
EKG QRS DURATION: 100 MS
EKG QTC CALCULATION (BAZETT): 441 MS
EKG R AXIS: -24 DEGREES
EKG T AXIS: 92 DEGREES
EKG VENTRICULAR RATE: 64 BPM

## 2020-07-06 PROCEDURE — 93010 ELECTROCARDIOGRAM REPORT: CPT | Performed by: INTERNAL MEDICINE

## 2020-10-12 ENCOUNTER — APPOINTMENT (OUTPATIENT)
Dept: GENERAL RADIOLOGY | Age: 85
DRG: 312 | End: 2020-10-12
Payer: MEDICARE

## 2020-10-12 ENCOUNTER — HOSPITAL ENCOUNTER (OUTPATIENT)
Age: 85
Setting detail: OBSERVATION
Discharge: NURSING, REHAB OR LONG-TERM CARE | DRG: 312 | End: 2020-10-16
Attending: EMERGENCY MEDICINE | Admitting: HOSPITALIST
Payer: MEDICARE

## 2020-10-12 ENCOUNTER — APPOINTMENT (OUTPATIENT)
Dept: CT IMAGING | Age: 85
DRG: 312 | End: 2020-10-12
Payer: MEDICARE

## 2020-10-12 PROBLEM — R55 SYNCOPE AND COLLAPSE: Status: ACTIVE | Noted: 2020-10-12

## 2020-10-12 LAB
ANION GAP SERPL CALCULATED.3IONS-SCNC: 10 MMOL/L (ref 3–16)
BASOPHILS ABSOLUTE: 0.1 K/UL (ref 0–0.2)
BASOPHILS RELATIVE PERCENT: 0.9 %
BILIRUBIN URINE: NEGATIVE
BLOOD, URINE: ABNORMAL
BUN BLDV-MCNC: 18 MG/DL (ref 7–20)
CALCIUM SERPL-MCNC: 8.6 MG/DL (ref 8.3–10.6)
CHLORIDE BLD-SCNC: 106 MMOL/L (ref 99–110)
CLARITY: ABNORMAL
CO2: 22 MMOL/L (ref 21–32)
COLOR: YELLOW
COMMENT UA: ABNORMAL
CREAT SERPL-MCNC: 1.2 MG/DL (ref 0.8–1.3)
EKG ATRIAL RATE: 56 BPM
EKG DIAGNOSIS: NORMAL
EKG P AXIS: 9 DEGREES
EKG P-R INTERVAL: 214 MS
EKG Q-T INTERVAL: 458 MS
EKG QRS DURATION: 120 MS
EKG QTC CALCULATION (BAZETT): 441 MS
EKG R AXIS: -19 DEGREES
EKG T AXIS: 95 DEGREES
EKG VENTRICULAR RATE: 56 BPM
EOSINOPHILS ABSOLUTE: 0.2 K/UL (ref 0–0.6)
EOSINOPHILS RELATIVE PERCENT: 3.9 %
EPITHELIAL CELLS, UA: 3 /HPF (ref 0–5)
GFR AFRICAN AMERICAN: >60
GFR NON-AFRICAN AMERICAN: 57
GLUCOSE BLD-MCNC: 115 MG/DL (ref 70–99)
GLUCOSE BLD-MCNC: 120 MG/DL (ref 70–99)
GLUCOSE URINE: NEGATIVE MG/DL
HCT VFR BLD CALC: 36.1 % (ref 40.5–52.5)
HEMOGLOBIN: 12 G/DL (ref 13.5–17.5)
KETONES, URINE: NEGATIVE MG/DL
LEUKOCYTE ESTERASE, URINE: ABNORMAL
LYMPHOCYTES ABSOLUTE: 1.4 K/UL (ref 1–5.1)
LYMPHOCYTES RELATIVE PERCENT: 23.3 %
MCH RBC QN AUTO: 32.6 PG (ref 26–34)
MCHC RBC AUTO-ENTMCNC: 33.3 G/DL (ref 31–36)
MCV RBC AUTO: 97.9 FL (ref 80–100)
MICROSCOPIC EXAMINATION: YES
MONOCYTES ABSOLUTE: 0.5 K/UL (ref 0–1.3)
MONOCYTES RELATIVE PERCENT: 8.7 %
NEUTROPHILS ABSOLUTE: 3.7 K/UL (ref 1.7–7.7)
NEUTROPHILS RELATIVE PERCENT: 63.2 %
NITRITE, URINE: NEGATIVE
PDW BLD-RTO: 13.8 % (ref 12.4–15.4)
PERFORMED ON: ABNORMAL
PH UA: 6.5 (ref 5–8)
PLATELET # BLD: 183 K/UL (ref 135–450)
PMV BLD AUTO: 8 FL (ref 5–10.5)
POTASSIUM REFLEX MAGNESIUM: 4 MMOL/L (ref 3.5–5.1)
PROTEIN UA: 30 MG/DL
RBC # BLD: 3.68 M/UL (ref 4.2–5.9)
RBC UA: 13 /HPF (ref 0–4)
SODIUM BLD-SCNC: 138 MMOL/L (ref 136–145)
SPECIFIC GRAVITY UA: 1.01 (ref 1–1.03)
TROPONIN: <0.01 NG/ML
URINE REFLEX TO CULTURE: YES
URINE TYPE: ABNORMAL
UROBILINOGEN, URINE: 1 E.U./DL
WBC # BLD: 5.9 K/UL (ref 4–11)
WBC UA: >900 /HPF (ref 0–5)

## 2020-10-12 PROCEDURE — 93010 ELECTROCARDIOGRAM REPORT: CPT | Performed by: INTERNAL MEDICINE

## 2020-10-12 PROCEDURE — 6370000000 HC RX 637 (ALT 250 FOR IP): Performed by: HOSPITALIST

## 2020-10-12 PROCEDURE — G0378 HOSPITAL OBSERVATION PER HR: HCPCS

## 2020-10-12 PROCEDURE — 80048 BASIC METABOLIC PNL TOTAL CA: CPT

## 2020-10-12 PROCEDURE — 84484 ASSAY OF TROPONIN QUANT: CPT

## 2020-10-12 PROCEDURE — 6370000000 HC RX 637 (ALT 250 FOR IP): Performed by: INTERNAL MEDICINE

## 2020-10-12 PROCEDURE — 6360000002 HC RX W HCPCS: Performed by: EMERGENCY MEDICINE

## 2020-10-12 PROCEDURE — 2580000003 HC RX 258: Performed by: HOSPITALIST

## 2020-10-12 PROCEDURE — 93005 ELECTROCARDIOGRAM TRACING: CPT | Performed by: EMERGENCY MEDICINE

## 2020-10-12 PROCEDURE — 81001 URINALYSIS AUTO W/SCOPE: CPT

## 2020-10-12 PROCEDURE — 71045 X-RAY EXAM CHEST 1 VIEW: CPT

## 2020-10-12 PROCEDURE — 96372 THER/PROPH/DIAG INJ SC/IM: CPT

## 2020-10-12 PROCEDURE — 87086 URINE CULTURE/COLONY COUNT: CPT

## 2020-10-12 PROCEDURE — 2580000003 HC RX 258: Performed by: EMERGENCY MEDICINE

## 2020-10-12 PROCEDURE — 6360000002 HC RX W HCPCS: Performed by: HOSPITALIST

## 2020-10-12 PROCEDURE — 1200000000 HC SEMI PRIVATE

## 2020-10-12 PROCEDURE — 70450 CT HEAD/BRAIN W/O DYE: CPT

## 2020-10-12 PROCEDURE — 85025 COMPLETE CBC W/AUTO DIFF WBC: CPT

## 2020-10-12 PROCEDURE — 96365 THER/PROPH/DIAG IV INF INIT: CPT

## 2020-10-12 PROCEDURE — 99285 EMERGENCY DEPT VISIT HI MDM: CPT

## 2020-10-12 RX ORDER — POTASSIUM CHLORIDE 7.45 MG/ML
10 INJECTION INTRAVENOUS PRN
Status: DISCONTINUED | OUTPATIENT
Start: 2020-10-12 | End: 2020-10-16 | Stop reason: HOSPADM

## 2020-10-12 RX ORDER — POLYETHYLENE GLYCOL 3350 17 G/17G
17 POWDER, FOR SOLUTION ORAL 2 TIMES DAILY PRN
Status: DISCONTINUED | OUTPATIENT
Start: 2020-10-12 | End: 2020-10-16 | Stop reason: HOSPADM

## 2020-10-12 RX ORDER — ASPIRIN 81 MG/1
81 TABLET ORAL DAILY
COMMUNITY

## 2020-10-12 RX ORDER — SODIUM CHLORIDE 0.9 % (FLUSH) 0.9 %
10 SYRINGE (ML) INJECTION PRN
Status: DISCONTINUED | OUTPATIENT
Start: 2020-10-12 | End: 2020-10-16 | Stop reason: HOSPADM

## 2020-10-12 RX ORDER — ACETAMINOPHEN 650 MG/1
650 SUPPOSITORY RECTAL EVERY 6 HOURS PRN
Status: DISCONTINUED | OUTPATIENT
Start: 2020-10-12 | End: 2020-10-16 | Stop reason: HOSPADM

## 2020-10-12 RX ORDER — SODIUM CHLORIDE 0.9 % (FLUSH) 0.9 %
10 SYRINGE (ML) INJECTION EVERY 12 HOURS SCHEDULED
Status: DISCONTINUED | OUTPATIENT
Start: 2020-10-12 | End: 2020-10-16 | Stop reason: HOSPADM

## 2020-10-12 RX ORDER — PRAVASTATIN SODIUM 10 MG
40 TABLET ORAL NIGHTLY
Status: DISCONTINUED | OUTPATIENT
Start: 2020-10-12 | End: 2020-10-16 | Stop reason: HOSPADM

## 2020-10-12 RX ORDER — M-VIT,TX,IRON,MINS/CALC/FOLIC 27MG-0.4MG
1 TABLET ORAL DAILY
Status: DISCONTINUED | OUTPATIENT
Start: 2020-10-13 | End: 2020-10-16 | Stop reason: HOSPADM

## 2020-10-12 RX ORDER — SODIUM CHLORIDE 9 MG/ML
INJECTION, SOLUTION INTRAVENOUS CONTINUOUS
Status: DISCONTINUED | OUTPATIENT
Start: 2020-10-12 | End: 2020-10-14

## 2020-10-12 RX ORDER — ONDANSETRON 2 MG/ML
4 INJECTION INTRAMUSCULAR; INTRAVENOUS EVERY 6 HOURS PRN
Status: DISCONTINUED | OUTPATIENT
Start: 2020-10-12 | End: 2020-10-16 | Stop reason: HOSPADM

## 2020-10-12 RX ORDER — ACETAMINOPHEN 325 MG/1
650 TABLET ORAL EVERY 6 HOURS PRN
Status: DISCONTINUED | OUTPATIENT
Start: 2020-10-12 | End: 2020-10-16 | Stop reason: HOSPADM

## 2020-10-12 RX ORDER — M-VIT,TX,IRON,MINS/CALC/FOLIC 27MG-0.4MG
1 TABLET ORAL DAILY
COMMUNITY

## 2020-10-12 RX ORDER — LORATADINE 10 MG/1
10 TABLET ORAL DAILY PRN
COMMUNITY

## 2020-10-12 RX ORDER — VITAMIN B COMPLEX
5000 TABLET ORAL DAILY
Status: DISCONTINUED | OUTPATIENT
Start: 2020-10-13 | End: 2020-10-16 | Stop reason: HOSPADM

## 2020-10-12 RX ORDER — PROMETHAZINE HYDROCHLORIDE 25 MG/1
12.5 TABLET ORAL EVERY 6 HOURS PRN
Status: DISCONTINUED | OUTPATIENT
Start: 2020-10-12 | End: 2020-10-16 | Stop reason: HOSPADM

## 2020-10-12 RX ORDER — FAMOTIDINE 20 MG/1
20 TABLET, FILM COATED ORAL NIGHTLY
Status: DISCONTINUED | OUTPATIENT
Start: 2020-10-12 | End: 2020-10-16 | Stop reason: HOSPADM

## 2020-10-12 RX ORDER — CETIRIZINE HYDROCHLORIDE 10 MG/1
5 TABLET ORAL DAILY
Status: DISCONTINUED | OUTPATIENT
Start: 2020-10-13 | End: 2020-10-16 | Stop reason: HOSPADM

## 2020-10-12 RX ORDER — POTASSIUM CHLORIDE 20 MEQ/1
40 TABLET, EXTENDED RELEASE ORAL PRN
Status: DISCONTINUED | OUTPATIENT
Start: 2020-10-12 | End: 2020-10-16 | Stop reason: HOSPADM

## 2020-10-12 RX ORDER — FAMOTIDINE 20 MG/1
20 TABLET, FILM COATED ORAL 2 TIMES DAILY
Status: DISCONTINUED | OUTPATIENT
Start: 2020-10-12 | End: 2020-10-12 | Stop reason: DRUGHIGH

## 2020-10-12 RX ORDER — POLYETHYLENE GLYCOL 3350 17 G/17G
17 POWDER, FOR SOLUTION ORAL DAILY PRN
Status: DISCONTINUED | OUTPATIENT
Start: 2020-10-12 | End: 2020-10-16 | Stop reason: HOSPADM

## 2020-10-12 RX ORDER — ASPIRIN 81 MG/1
81 TABLET ORAL DAILY
Status: DISCONTINUED | OUTPATIENT
Start: 2020-10-13 | End: 2020-10-16 | Stop reason: HOSPADM

## 2020-10-12 RX ORDER — FINASTERIDE 5 MG/1
5 TABLET, FILM COATED ORAL DAILY
Status: DISCONTINUED | OUTPATIENT
Start: 2020-10-13 | End: 2020-10-16 | Stop reason: HOSPADM

## 2020-10-12 RX ADMIN — ENOXAPARIN SODIUM 40 MG: 40 INJECTION SUBCUTANEOUS at 20:54

## 2020-10-12 RX ADMIN — CEFTRIAXONE 1 G: 1 INJECTION, POWDER, FOR SOLUTION INTRAMUSCULAR; INTRAVENOUS at 12:16

## 2020-10-12 RX ADMIN — FAMOTIDINE 20 MG: 20 TABLET, FILM COATED ORAL at 20:54

## 2020-10-12 RX ADMIN — PRAVASTATIN SODIUM 40 MG: 10 TABLET ORAL at 20:54

## 2020-10-12 RX ADMIN — SODIUM CHLORIDE: 9 INJECTION, SOLUTION INTRAVENOUS at 16:40

## 2020-10-12 ASSESSMENT — PAIN SCALES - GENERAL: PAINLEVEL_OUTOF10: 0

## 2020-10-12 NOTE — H&P
Hospitalist  History and Physical    Patient:  Mitul Spivey  MRN: [id number]  PCP: No primary care provider on file. CHIEF COMPLAINT:  Loss of Consciousness      HISTORY OF PRESENT ILLNESS:   The patient Mitul Spivey is a 80 y.o.male with medical history significant for Alzheimer's disease heart murmur hyperlipidemia and hypothyroidism. Patient presented to the emergency room after having an episode of syncope at home. Patient is nonverbal and is unable to provide any history  Reportedly patient normally ambulates with a walker. He is a resident of extended care facility  He was being assisted out of the shower when he passed out. Patient did not have a seizure disorder    Past Medical History:        Diagnosis Date    Alzheimer disease ([facility] [address]     Heart murmur     Hyperlipidemia     Thyroid disease        Past Surgical History:    History reviewed. No pertinent surgical history. Medications Prior to Admission:    Prior to Admission medications    Medication Sig Start Date End Date Taking?  Authorizing Provider   Multiple Vitamins-Minerals (THERAPEUTIC MULTIVITAMIN-MINERALS) tablet Take 1 tablet by mouth daily   Yes Historical Provider, MD   aspirin 81 MG EC tablet Take 81 mg by mouth daily   Yes Historical Provider, MD   vitamin D (CHOLECALCIFEROL) 25 MCG (1000 UT) TABS tablet Take 5,000 Units by mouth daily   Yes Historical Provider, MD   loratadine (CLARITIN) 10 MG tablet Take 10 mg by mouth daily as needed (allergies)   Yes Historical Provider, MD   levothyroxine (SYNTHROID) 175 MCG tablet Take 175 mcg by mouth Daily   Yes Historical Provider, MD   pravastatin (PRAVACHOL) 40 MG tablet Take 40 mg by mouth nightly   Yes Historical Provider, MD   polyethylene glycol (MIRALAX) PACK packet Take 17 g by mouth 2 times daily as needed (constipation)    Yes Historical Provider, MD   finasteride (PROSCAR) 5 MG tablet Take 5 mg by mouth daily   Yes Historical Provider, MD       Allergies:  Patient has no known allergies. Social History:   TOBACCO:   reports that he has never smoked. He has never used smokeless tobacco.  ETOH:   reports no history of alcohol use. Family History:   Patient unable to provide any history        REVIEW OF SYSTEMS:     Unable to do review of systems due to patient's mental status      CONSTITUTIONAL:      fatigue, fever, chills or night sweats, recent weight gain, recent wt loss, insomnia,  General weakness, poor appetite, muscle aches and pains    HEAD: headache, dizziness    EYES:      blurriness,  double vision, dryness,  discharge, irritation,diplopia    EARS:      hearing loss, vertigo, ear discharge,  Earache. Ringing in the ears. NOSE:      Rhinorrhea, sneezing, epistaxis. Discharge, sinusitis,     MOUTH/THROAT:         sore throat, mouth ulcers, Hoarseness    RESPIRATORY:        Shortness of breath, wheezing,  cough, sputum, hemoptysis, obstructive sleep apnea,    CARDIOVASCULAR :      chest pain, palpitations, dyspnea on exercise, Lower extrimity edema (swelling),     GASTROINTESTINAL:       Dysphagia, Poor appetite,  Nausea, Vomiting, diarrhea, heartburn, abdominal pain. Blood in the stools, hematemesis. Pain with swallowing, constipation    GENITOURINARY:       Urinary frequency, hesitancy,  urgency, Dysuria, hematuria,  Urinary Incontinence. Urinary Retention. GYNECOLOGICAL: vaginal bleeding , vaginal discharge, menopause    MUSCULOSKELETAL:       joint swelling or stiffness, joint pain, muscle pain, balance problems, low back pain. NEUROLOGICAL:      Gait problems. Tremor. Dizziness. Pain and paresthesias, weakness in extremities.  Seizures, memory loss    PSYCHLOGICAL:        Anxiety, depression    SKIN :      Rashes ulcers, skin color changes, easy bruisability, lymphadenopathy      Physical Exam:      Vitals: /70   Pulse 66   Temp 98.8 °F (37.1 °C) (Rectal)   Resp 18   Wt 188 lb 15 oz (85.7 kg)   SpO2 98%   BMI 25.62 kg/m²     Gen:          Alert statin therapy while in the hospital      DVT and GI prophylaxis      Prior      Joseph Hawk M.D    This note was transcribed using [address] [facility]. Please disregard any translational errors.

## 2020-10-12 NOTE — ED TRIAGE NOTES
Patient admitted to ED via [facility] from [place] after witnessed syncopal episode. Per EMS, patient was being taken out of the shower when he slowly collapsed. He did not fall or hit his head. They stated that his BP was originally 80/50s. Patient has dementia and does not talk per baseline. VS upon admission are stable. Patient is alert. Unable to assess orientation.

## 2020-10-12 NOTE — ED NOTES
Report given to Hallie Ding RN who is covering for hold nurse lunch shift.  Told to call with any questions/clarifications/     Nina Flores RN  10/12/20 1043

## 2020-10-12 NOTE — ED PROVIDER NOTES
[address] [facility]      Pt Name: Bre Finley  MRN: [id number]  [place] 10/6/1929  Date of evaluation: 10/12/2020  Provider: Marge Villagran DO    CHIEF COMPLAINT       Chief Complaint   Patient presents with    Loss of Consciousness     from [address], slowly collapsed while being taken out of the shower, did not hit his head\         HISTORY OF PRESENT ILLNESS   (Location/Symptom, Timing/Onset, Context/Setting, Quality, Duration, Modifying Factors, Severity)  Note limiting factors. Bre Finley is a 80 y.o. male who presents to the emergency department with a complaint of a syncopal episode that occurred prior to arrival.  The patient is a resident of an extended care facility and was being assisted out of the shower when he slowly collapsed and briefly lost consciousness. There was no report of any seizure activity. He did not hit his head. Staff was using a gait belt for assistance. There are no family was at the bedside. The patient is nonverbal.    Historical information is very limited. The patient is not able to provide any information based on his baseline mental status. There is no report of any recent illness, fever, chills, cough, cold symptoms, headache, focal weakness, numbness, chest pain, shortness of breath, abdominal pain, vomiting, diarrhea, aspiration. No report of any recent trauma or injury. Nursing Notes were reviewed. HPI        REVIEW OF SYSTEMS    (2-9 systems for level 4, 10 or more for level 5)       Constitutional: Negative for fever or chills. HENT: Negative for rhinorrhea and sore throat. Eyes: Negative for redness or drainage. Respiratory: Negative for shortness of breath or dyspnea on exertion. Cardiovascular: Negative for chest pain. Gastrointestinal: Negative for abdominal pain. Negative for vomiting or diarrhea. Genitourinary: Negative for flank pain. Negative for dysuria.   Negative for hematuria. Neurological: Negative for headache. Musculoskeletal:  Negative edema. Hematological: Negative for adenopathy. All systems are reviewed and are negative except for those listed above in the history of present illness and ROS. PAST MEDICAL HISTORY     Past Medical History:   Diagnosis Date    Alzheimer disease ([address]     Heart murmur     Hyperlipidemia     Thyroid disease          SURGICAL HISTORY     History reviewed. No pertinent surgical history. CURRENT MEDICATIONS       Previous Medications    ASPIRIN 81 MG EC TABLET    Take 81 mg by mouth daily    FINASTERIDE (PROSCAR) 5 MG TABLET    Take 5 mg by mouth daily    LEVOTHYROXINE (SYNTHROID) 175 MCG TABLET    Take 175 mcg by mouth Daily    LORATADINE (CLARITIN) 10 MG TABLET    Take 10 mg by mouth daily as needed (allergies)    MULTIPLE VITAMINS-MINERALS (THERAPEUTIC MULTIVITAMIN-MINERALS) TABLET    Take 1 tablet by mouth daily    POLYETHYLENE GLYCOL (MIRALAX) PACK PACKET    Take 17 g by mouth 2 times daily as needed (constipation)     PRAVASTATIN (PRAVACHOL) 40 MG TABLET    Take 40 mg by mouth nightly    VITAMIN D (CHOLECALCIFEROL) 25 MCG (1000 UT) TABS TABLET    Take 5,000 Units by mouth daily       ALLERGIES     Patient has no known allergies. FAMILY HISTORY     History reviewed. No pertinent family history.        SOCIAL HISTORY       Social History     Socioeconomic History    Marital status:      Spouse name: None    Number of children: None    Years of education: None    Highest education level: None   Occupational History    None   Social Needs    Financial resource strain: None    Food insecurity     Worry: None     Inability: None    Transportation needs     Medical: None     Non-medical: None   Tobacco Use    Smoking status: Never Smoker    Smokeless tobacco: Never Used   Substance and Sexual Activity    Alcohol use: No    Drug use: None    Sexual activity: None   Lifestyle    Physical activity Days per week: None     Minutes per session: None    Stress: None   Relationships    Social connections     Talks on phone: None     Gets together: None     Attends Buddhism service: None     Active member of club or organization: None     Attends meetings of clubs or organizations: None     Relationship status: None    Intimate partner violence     Fear of current or ex partner: None     Emotionally abused: None     Physically abused: None     Forced sexual activity: None   Other Topics Concern    None   Social History Narrative    None       SCREENINGS             PHYSICAL EXAM    (up to 7 for level 4, 8 or more for level 5)     ED Triage Vitals [10/12/20 0936]   BP Temp Temp Source Pulse Resp SpO2 Height Weight   (!) 108/51 98.8 °F (37.1 °C) Rectal 57 16 95 % -- 188 lb 15 oz (85.7 kg)         Physical Exam   Constitutional: Awake but nonverbal.  Good eye contact. Head: No visible evidence of trauma. Normocephalic. Eyes: Pupils equal and reactive. No photophobia. Conjunctiva normal.    HENT: Oral mucosa moist.  Airway patent. Pharynx without erythema. Nares were clear. No stridor or drooling. Neck:  Soft and supple. Nontender. No point or axial tenderness. Heart:  Regular rate and rhythm. No murmur. Lungs:  Clear to auscultation. No wheezes, rales, or ronchi. No conversational dyspnea or accessory muscle use. Chest: Chest wall non-tender. No evidence of trauma. Abdomen:  Soft, nondistended, bowel sounds present. Nontender. No guarding rigidity or rebound. No masses. Unable to elicit any abdominal discomfort to palpation. Diaper present. Moist with urine. Musculoskeletal: Extremities non-tender with full range of motion. Radial and dorsalis pedis pulses were intact. No calf tenderness erythema or edema. Neurological: Awake. Nonverbal.  Follows some simple commands such as opening his mouth, squeezing hands, etc.   strength equal bilaterally. Uncooperative with pronator drift. Wiggles toes bilaterally. Speech clear. Cranial nerves II-XII intact. No facial droop. No apparent acute focal motor or sensory deficits. Skin: Skin is warm and dry. No rash. Lymphatic:  No lympadenopathy. Psychiatric: Unable to assess. She is nonverbal.        DIAGNOSTIC RESULTS     EKG: All EKG's are interpreted by the Emergency Department Physician who either signs or Co-signs this chart in the absence of a cardiologist.    Sinus bradycardia. Rate 56. NM interval 214 ms. First-degree AV block. QRS duration 120 ms. QTc 441 ms.  R axis -19 degrees. Interventricular conduction delay. No ST elevation. No acute change. RADIOLOGY:   Non-plain film images such as CT, Ultrasound and MRI are read by the radiologist. Plain radiographic images are visualized and preliminarily interpreted by the emergency physician with the below findings:        Interpretation per the Radiologist below, if available at the time of this note:    XR CHEST PORTABLE   Final Result   No acute cardiopulmonary disease. CT Head WO Contrast   Final Result   No hemorrhage or mass identified.       Atrophy and small-vessel ischemic change, similar to prior               ED BEDSIDE ULTRASOUND:   Performed by ED Physician - none    LABS:  Labs Reviewed   CBC WITH AUTO DIFFERENTIAL - Abnormal; Notable for the following components:       Result Value    RBC 3.68 (*)     Hemoglobin 12.0 (*)     Hematocrit 36.1 (*)     All other components within normal limits    Narrative:     Performed at:  [facility]  [address] [address]   Phone [phone]   BASIC METABOLIC PANEL W/ REFLEX TO MG FOR LOW K - Abnormal; Notable for the following components:    Glucose 120 (*)     GFR Non- 57 (*)     All other components within normal limits    Narrative:     Performed at:  [facility]  [address] [place] [address]   Phone [phone]   URINE RT REFLEX TO CULTURE - Abnormal; Notable for the following components:    Clarity, UA TURBID (*)     Blood, Urine SMALL (*)     Protein, UA 30 (*)     Leukocyte Esterase, Urine LARGE (*)     All other components within normal limits    Narrative:     Performed at:  [address] [facility] [address]   Phone [phone]   MICROSCOPIC URINALYSIS - Abnormal; Notable for the following components:    WBC, UA >900 (*)     RBC, UA 13 (*)     All other components within normal limits    Narrative:     Performed at:  [address] [facility] [address]   Phone [phone]   POCT GLUCOSE - Abnormal; Notable for the following components:    POC Glucose 115 (*)     All other components within normal limits    Narrative:     Performed at:  [address] [facility] [address]   Phone [phone]   CULTURE, URINE   TROPONIN    Narrative:     Performed at:  [address] [facility] [address]   Phone [phone]   POCT GLUCOSE       All other labs were within normal range or not returned as of this dictation. EMERGENCY DEPARTMENT COURSE and DIFFERENTIAL DIAGNOSIS/MDM:   Vitals:    Vitals:    10/12/20 1335 10/12/20 1350 10/12/20 1405 10/12/20 1420   BP: (!) 149/79 (!) 148/69 139/66 (!) 153/70   Pulse: 63 61 63 60   Resp: 13 16 13 15   Temp:       TempSrc:       SpO2: 99% 100% 100% 99%   Weight:             MDM      Patient presented with a syncopal episode that occurred prior to arrival.  At the time of my examination he is awake but nonverbal.  He follows some simple commands. This appears to be consistent with his baseline mental status. Accu-Chek is 115. He was sent for CT head without contrast.  No apparent acute focal motor or sensory deficits.       REASSESSMENT          10:43 AM: I spoke with the patient's wife who is now at the bedside. The patient actually lives at an extended care facility in an independent living in an apartment with his wife. He normally ambulates with a walker and requires assistance with bathing and ADLs. She states that this morning she and the home health nurse were assisting him with taking a bath. He was standing after getting out of the shower and they were drying him off when he suddenly became very pale, diaphoretic, and slumped over and became unresponsive. There was no seizure activity. She states that he was unresponsive for approximately 15 minutes and then gradually returned to baseline. She states that he is normally nonverbal as a result of prior \"aphasia\" and also has a history of dementia. She states that this has been a progressive issue. She denies any prior history of stroke or seizure activity. She states that he has had prior episodes where he has passed out in the past but they have been very brief. I did have a discussion with the patient's wife at the bedside regarding [facility] [address]. The patient is DNR CCA. She wishes all measures of care up to the point of cardiac arrest in which case the patient would not want CPR, intubation, or life support measures. 11:46 AM: Laboratory studies were reviewed. Urinalysis reveals large leukocytes with greater than 900 white cells and 13 red cells. Findings are consistent with urinary tract infection. Culture is pending. The patient was treated with Rocephin 1 g IV. Patient does have evidence of a syncopal episode that occurred today. He is hemodynamically stable currently. Mental status has returned to baseline according to his wife is at the bedside. The etiology of his syncope is unclear. To include arrhythmia. No evidence of hypotension at this time. ET head is unremarkable. He will be admitted for further treatment and evaluation.   I was placed to the hospitalist on-call for admission. CRITICAL CARE TIME   Total Critical Care time was 0 minutes, excluding separately reportable procedures. There was a high probability of clinically significant/life threatening deterioration in the patient's condition which required my urgent intervention. CONSULTS:  None    PROCEDURES:  Unless otherwise noted below, none     Procedures        FINAL IMPRESSION      1. Syncope and collapse    2. Urinary tract infection without hematuria, site unspecified          DISPOSITION/PLAN   DISPOSITION        PATIENT REFERRED TO:  No follow-up provider specified. DISCHARGE MEDICATIONS:  New Prescriptions    No medications on file     Controlled Substances Monitoring:     No flowsheet data found. (Please note that portions of this note were completed with a voice recognition program.  Efforts were made to edit the dictations but occasionally words are mis-transcribed. )    Yen Carrillo DO (electronically signed)  Attending Emergency Physician          Sandy Maxwell DO  10/12/20 4215

## 2020-10-12 NOTE — ED NOTES
Bed: RUST  Expected date:   Expected time:   Means of arrival:   Comments:  [facility] [address]  10/12/20 7255

## 2020-10-13 LAB
ANION GAP SERPL CALCULATED.3IONS-SCNC: 8 MMOL/L (ref 3–16)
BUN BLDV-MCNC: 14 MG/DL (ref 7–20)
CALCIUM SERPL-MCNC: 8.9 MG/DL (ref 8.3–10.6)
CHLORIDE BLD-SCNC: 104 MMOL/L (ref 99–110)
CO2: 25 MMOL/L (ref 21–32)
CREAT SERPL-MCNC: 1 MG/DL (ref 0.8–1.3)
GFR AFRICAN AMERICAN: >60
GFR NON-AFRICAN AMERICAN: >60
GLUCOSE BLD-MCNC: 102 MG/DL (ref 70–99)
HCT VFR BLD CALC: 37.8 % (ref 40.5–52.5)
HEMOGLOBIN: 12.9 G/DL (ref 13.5–17.5)
LV EF: 63 %
LVEF MODALITY: NORMAL
MCH RBC QN AUTO: 32.7 PG (ref 26–34)
MCHC RBC AUTO-ENTMCNC: 34.1 G/DL (ref 31–36)
MCV RBC AUTO: 95.8 FL (ref 80–100)
PDW BLD-RTO: 13.6 % (ref 12.4–15.4)
PLATELET # BLD: 204 K/UL (ref 135–450)
PMV BLD AUTO: 7.6 FL (ref 5–10.5)
POTASSIUM REFLEX MAGNESIUM: 3.6 MMOL/L (ref 3.5–5.1)
RBC # BLD: 3.94 M/UL (ref 4.2–5.9)
SODIUM BLD-SCNC: 137 MMOL/L (ref 136–145)
URINE CULTURE, ROUTINE: NORMAL
WBC # BLD: 5.3 K/UL (ref 4–11)

## 2020-10-13 PROCEDURE — 6360000002 HC RX W HCPCS: Performed by: HOSPITALIST

## 2020-10-13 PROCEDURE — 6370000000 HC RX 637 (ALT 250 FOR IP): Performed by: HOSPITALIST

## 2020-10-13 PROCEDURE — 97162 PT EVAL MOD COMPLEX 30 MIN: CPT

## 2020-10-13 PROCEDURE — 6370000000 HC RX 637 (ALT 250 FOR IP): Performed by: INTERNAL MEDICINE

## 2020-10-13 PROCEDURE — 96366 THER/PROPH/DIAG IV INF ADDON: CPT

## 2020-10-13 PROCEDURE — 97530 THERAPEUTIC ACTIVITIES: CPT

## 2020-10-13 PROCEDURE — 85027 COMPLETE CBC AUTOMATED: CPT

## 2020-10-13 PROCEDURE — 97166 OT EVAL MOD COMPLEX 45 MIN: CPT

## 2020-10-13 PROCEDURE — 96372 THER/PROPH/DIAG INJ SC/IM: CPT

## 2020-10-13 PROCEDURE — G0378 HOSPITAL OBSERVATION PER HR: HCPCS

## 2020-10-13 PROCEDURE — 2580000003 HC RX 258: Performed by: HOSPITALIST

## 2020-10-13 PROCEDURE — 36415 COLL VENOUS BLD VENIPUNCTURE: CPT

## 2020-10-13 PROCEDURE — 93306 TTE W/DOPPLER COMPLETE: CPT

## 2020-10-13 PROCEDURE — 80048 BASIC METABOLIC PNL TOTAL CA: CPT

## 2020-10-13 RX ADMIN — SODIUM CHLORIDE: 9 INJECTION, SOLUTION INTRAVENOUS at 02:40

## 2020-10-13 RX ADMIN — CEFTRIAXONE 1 G: 1 INJECTION, POWDER, FOR SOLUTION INTRAMUSCULAR; INTRAVENOUS at 12:31

## 2020-10-13 RX ADMIN — SODIUM CHLORIDE: 9 INJECTION, SOLUTION INTRAVENOUS at 12:31

## 2020-10-13 RX ADMIN — FINASTERIDE 5 MG: 5 TABLET, FILM COATED ORAL at 11:00

## 2020-10-13 RX ADMIN — ASPIRIN 81 MG: 81 TABLET, COATED ORAL at 11:00

## 2020-10-13 RX ADMIN — SODIUM CHLORIDE: 9 INJECTION, SOLUTION INTRAVENOUS at 23:03

## 2020-10-13 RX ADMIN — MULTIPLE VITAMINS W/ MINERALS TAB 1 TABLET: TAB at 11:00

## 2020-10-13 RX ADMIN — PRAVASTATIN SODIUM 40 MG: 10 TABLET ORAL at 20:21

## 2020-10-13 RX ADMIN — CETIRIZINE HYDROCHLORIDE 5 MG: 10 TABLET, FILM COATED ORAL at 11:00

## 2020-10-13 RX ADMIN — LEVOTHYROXINE SODIUM 175 MCG: 0.1 TABLET ORAL at 11:01

## 2020-10-13 RX ADMIN — FAMOTIDINE 20 MG: 20 TABLET, FILM COATED ORAL at 20:20

## 2020-10-13 RX ADMIN — Medication 5000 UNITS: at 11:00

## 2020-10-13 RX ADMIN — ENOXAPARIN SODIUM 40 MG: 40 INJECTION SUBCUTANEOUS at 20:20

## 2020-10-13 RX ADMIN — ACETAMINOPHEN 650 MG: 325 TABLET ORAL at 05:28

## 2020-10-13 RX ADMIN — ACETAMINOPHEN 650 MG: 325 TABLET ORAL at 20:20

## 2020-10-13 NOTE — PROGRESS NOTES
Hospitalist Progress Note  10/13/2020 9:21 AM    PCP: No primary care provider on file. [phone]     Date of Admission: 10/12/2020                                                                                                                     HOSPITAL COURSE    Patient demographics:  The patient  Rafael Peter is a 80 y.o. male     Significant past medical history:   Patient Active Problem List   Diagnosis    AMS (altered mental status)    Syncope and collapse         Presenting symptoms:  Loss of Consciousness    Diagnostic workup:      CONSULTS DURING ADMISSION :   IP CONSULT TO NEUROLOGY      Patient was diagnosed with:        Treatment while inpatient:  Patient presented to the emergency room after having an episode of syncope at home.                                                                                       ----------------------------------------------------------      SUBJECTIVE COMPLAINTS-  Follow up for Syncope     Diet: DIET GENERAL;      OBJECTIVE:   Patient Active Problem List   Diagnosis    AMS (altered mental status)    Syncope and collapse       Allergies  Patient has no known allergies.     Medications    Scheduled Meds:   aspirin  81 mg Oral Daily    finasteride  5 mg Oral Daily    levothyroxine  175 mcg Oral Daily    cetirizine  5 mg Oral Daily    therapeutic multivitamin-minerals  1 tablet Oral Daily    pravastatin  40 mg Oral Nightly    Vitamin D  5,000 Units Oral Daily    sodium chloride flush  10 mL Intravenous 2 times per day    enoxaparin  40 mg Subcutaneous Nightly    famotidine  20 mg Oral Nightly     Continuous Infusions:   sodium chloride 100 mL/hr at 10/13/20 0240     PRN Meds:  polyethylene glycol, sodium chloride flush, potassium chloride **OR** potassium alternative oral replacement **OR** potassium chloride, acetaminophen **OR** acetaminophen, polyethylene glycol, promethazine **OR** ondansetron    Vitals   Vitals /wt   Patient Vitals for the past 8 hrs: BP Temp Temp src Pulse Resp SpO2 Weight   10/13/20 0514 (!) 159/68 97.2 °F (36.2 °C) Oral 73 17 99 % 187 lb 2.7 oz (84.9 kg)        72HR INTAKE/OUTPUT:      Intake/Output Summary (Last 24 hours) at 10/13/2020 0921  Last data filed at 10/13/2020 0514  Gross per 24 hour   Intake 1305.51 ml   Output --   Net 1305.51 ml       Exam:    Gen:   Alert and oriented × patient is nonverbal  Eyes: PERRL. No sclera icterus. No conjunctival injection. ENT: No discharge. Pharynx clear. External appearance of ears and nose normal.  Neck: Trachea midline. No obvious mass. Resp: No accessory muscle use. No crackles. No wheezes. No rhonchi. CV: Regular rate. Regular rhythm. No murmur or rub. No edema. GI: Non-tender. Non-distended. No hernia. Skin: Warm, dry, normal texture and turgor. Lymph: No cervical LAD. No supraclavicular LAD. M/S: / Ext. No cyanosis. No clubbing. No joint deformity. Neuro: CN 2-12 are intact,  no neurologic deficits noted. PT/INR: No results for input(s): PROTIME, INR in the last 72 hours. APTT: No results for input(s): APTT in the last 72 hours. CBC:   Recent Labs     10/12/20  0953 10/13/20  0709   WBC 5.9 5.3   HGB 12.0* 12.9*   HCT 36.1* 37.8*   MCV 97.9 95.8    204       BMP:   Recent Labs     10/12/20  0953 10/13/20  0709    137   K 4.0 3.6    104   CO2 22 25   BUN 18 14   CREATININE 1.2 1.0       LIVER PROFILE: No results for input(s): ALKPHOS, AST, ALT, ALB, BILIDIR, BILITOT, ALKPHOS in the last 72 hours. No results for input(s): AMYLASE in the last 72 hours. No results for input(s): LIPASE in the last 72 hours. UA:  Recent Labs     10/12/20  1103   WBCUA >900*   RBCUA 13*       TROPONIN:   Recent Labs     10/12/20  0953   TROPONINI <0.01       Lab Results   Component Value Date/Time    URRFLXCULT Yes 10/12/2020 11:03 AM       No results for input(s): TSHREFLEX in the last 72 hours.     No components found for: [id number]  POC GLUCOSE:    Recent Labs

## 2020-10-13 NOTE — PROGRESS NOTES
Physical Therapy    [facility]  Initial Assessment    NAME: Ismael Lyons  : 10/6/1929  MRN: [id number]    Date of Service: 10/13/2020    Discharge Recommendations:  3-5 sessions per week, Patient would benefit from continued therapy after discharge   PT Equipment Recommendations  Equipment Needed: No  Other: defer to next level of care     Ismael Lyons scored a 9/24 on the AM-PAC short mobility form. Current research shows that an AM-PAC score of 17 or less is typically not associated with a discharge to the patient's home setting. Based on the patient's AM-PAC score and their current functional mobility deficits, it is recommended that the patient have 3-5 sessions per week of Physical Therapy at d/c to increase the patient's independence. Please see assessment section for further patient specific details. If patient discharges prior to next session this note will serve as a discharge summary. Please see below for the latest assessment towards goals. Assessment   Body structures, Functions, Activity limitations: Decreased functional mobility   Assessment: Pt is a 79 y/o male who presented to the ED from [facility] after syncopal episode getting out of the shower. Pt has hx of Alzheimer's disease and is non-verbal.  Per chart, pt lives in assisted living with his wife and has a HHA 3x/day to assist with ADLs, transfers and ambulation. Pt non-verbal so unable to confirm. Today, pt unable to follow commands and required max-total assist of 2 for bed mobility. Session limited by IV site bleeding (pt pulling on IV line and difficulty to re-direct). Pt sat EOB for approx 15 min with CGA-Girma for sitting balance and then too fatigued to attempt standing. However, anticipate that pt would require assist of 2 and LIFT equipment for bed to chair transfer.   Pt is far below his functional baseline and will benefit from continued skilled inpatient PT at a low-moderate intensity upon d/c to decrease burden of care. Will continue to follow. Treatment Diagnosis: functional mobility below baseline  Prognosis: Fair  Decision Making: Medium Complexity  History: Pt is a 81 y/o male who presented to the ED from [address] [place] [address] living after syncopal episode getting out of the shower. Pt has hx of Alzheimer's disease and is non-verbal.  Exam: bed mobility  Clinical Presentation: evolving  PT Education: PT Role  Barriers to Learning: cognition  REQUIRES PT FOLLOW UP: Yes  Activity Tolerance  Activity Tolerance: Patient limited by cognitive status; Patient limited by endurance; Patient limited by fatigue  Activity Tolerance: pt not following commands, pulling on IV line and telemetry throughout session       Patient Diagnosis(es): The primary encounter diagnosis was Syncope and collapse. A diagnosis of Urinary tract infection without hematuria, site unspecified was also pertinent to this visit. has a past medical history of Alzheimer disease ([facility] [address] Heart murmur, Hyperlipidemia, and Thyroid disease. has no past surgical history on file. Restrictions  Restrictions/Precautions  Restrictions/Precautions: Fall Risk  Position Activity Restriction  Other position/activity restrictions: IV, non-verbal     Vision/Hearing  Vision: Within Functional Limits  Hearing: Within functional limits       Subjective  General  Chart Reviewed: Yes  Patient assessed for rehabilitation services?: Yes  Additional Pertinent Hx: Pt is a 81 y/o male who presented to the ED from [address] [place] [address] after syncopal episode getting out of the shower.   Pt has hx of Alzheimer's disease and is non-verbal.  Response To Previous Treatment: Not applicable  Family / Caregiver Present: No  Referring Practitioner: Gregorio Morales MD  Referral Date : 10/13/20  Diagnosis: syncope  Follows Commands: Impaired  Subjective  Subjective: Pt in bed upon arrival.  Non-verbal.  Pt winced in pain during bed mobility but unable to further express pain. Pain Screening  Patient Currently in Pain: Other (comment)(Unable to verbally assess)          Orientation  Orientation  Overall Orientation Status: (unable to assess due to pt being non-verbal)     Social/Functional History  Social/Functional History  Lives With: Spouse  Type of Home: Assisted living([facility])  Home Layout: One level  Home Access: Elevator  Bathroom Shower/Tub: Walk-in shower  Bathroom Toilet: Handicap height(RTS with rails)  Bathroom Equipment: Shower chair, Grab bars in shower, Toilet raiser(pt won't use shower chair)  Bathroom Accessibility: Accessible  Home Equipment: 4 wheeled walker, Lift chair  Receives Help From: Home health(HHA 3x/day for adl's, 2 hours in am, 1-3pm, 7-9 pm)  ADL Assistance: Needs assistance(HHA assists with shower, dressing. Wife or aid assists with toileting (pt also uses depends. Pt feeds himself.)  Homemaking Assistance: Needs assistance(Pt walks to dining room with assist of HHA for meals)  Homemaking Responsibilities: No  Ambulation Assistance: Needs assistance(with [employer])  Transfer Assistance: Needs assistance(HHA assist pt getting in/out of bed, out of chair)  Active : No  Patient's  Info: transportation through [employer] or pt's daughters both live in town  Additional Comments: Pt sleeps in regular bed.   pt non-verbal so above info taken from previous therapy note       Objective          PROM RLE (degrees)  RLE General PROM: unable to assess AROM, pt stiff and resisting movement throughout session  AROM RLE (degrees)  RLE General AROM: unable to assess AROM, pt stiff and resisting movement throughout session  PROM LLE (degrees)  LLE General PROM: unable to assess AROM, pt stiff and resisting movement throughout session  AROM LLE (degrees)  LLE General AROM: unable to assess AROM, pt stiff and resisting movement throughout session  Strength RLE  Comment: unable to assess; pt not following commands and resisting therapist throughout the session        Bed mobility  Rolling to Left: Maximum assistance;2 Person assistance  Rolling to Right: Maximum assistance;2 Person assistance  Supine to Sit: 2 Person assistance;Maximum assistance  Sit to Supine: 2 Person assistance;Maximum assistance  Scooting: Dependent/Total;2 Person assistance  Comment: pt had difficulty following commands and resisted rolling back and forth in bed for bed sheets to be changed     Transfers  Sit to Stand: Unable to assess  Stand to sit: Unable to assess  Bed to Chair: Unable to assess  Comment: pt too fatigued after sitting EOB for approx 15 min to attempt transfers- noted pt's IV site bleeding after supine to sit; RN called to the room and applied pressure and re-wrapped IV while pt sat EOB     Ambulation  Ambulation?: No     Balance  Posture: Fair  Comments: pt required CGA-Girma for sitting balance EOB due to posterior lean; with fatigue leaning on L elbow and did not correct with verbal or tactile cues        Plan   Plan  Times per week: 3-5x/wk  Current Treatment Recommendations: Functional Mobility Training  Safety Devices  Type of devices:  All fall risk precautions in place, Bed alarm in place, Call light within reach, Left in bed, Nurse notified(RN Rajni notified)  Restraints  Initially in place: No    AM-PAC Score  AM-PAC Inpatient Mobility Raw Score : 9 (10/13/20 1623)  AM-PAC Inpatient T-Scale Score : 30.55 (10/13/20 1623)  Mobility Inpatient CMS 0-100% Score: 81.38 (10/13/20 1623)  Mobility Inpatient CMS G-Code Modifier : CM (10/13/20 1623)          Goals  Short term goals  Time Frame for Short term goals: upon d/c  Short term goal 1: bed mobility modA x2  Short term goal 2: transfer to bedside chair via [facility] stedy with assist of 2  Short term goal 3: assess ambulation when appropriate  Patient Goals   Patient goals : pt unable to state goals due to cognition       Therapy Time   Individual Concurrent Group Co-treatment   Time In 1540         Time Out 1615 Minutes 35         Timed Code Treatment Minutes: 20 Minutes         Electronically signed by Nancy Diaz, PT [id number] on 10/13/2020 at 4:30 PM

## 2020-10-13 NOTE — PLAN OF CARE
Problem: Skin Integrity:  Goal: Will show no infection signs and symptoms  Description: Will show no infection signs and symptoms  10/13/2020 1138 by Meseret Velasco RN  Outcome: Ongoing  10/13/2020 0402 by Doris Connelly RN  Outcome: Ongoing  Goal: Absence of new skin breakdown  Description: Absence of new skin breakdown  10/13/2020 1138 by Meseret Velasco RN  Outcome: Ongoing  10/13/2020 0402 by Doris Connelly RN  Outcome: Ongoing     Problem: Falls - Risk of:  Goal: Will remain free from falls  Description: Will remain free from falls  10/13/2020 1138 by Meseret Velasco RN  Outcome: Ongoing  10/13/2020 0402 by Doris Connelly RN  Outcome: Ongoing  Goal: Absence of physical injury  Description: Absence of physical injury  10/13/2020 1138 by Meseret Velasco RN  Outcome: Ongoing  10/13/2020 0402 by Doris Connelly RN  Outcome: Ongoing     Problem: Pain:  Goal: Pain level will decrease  Description: Pain level will decrease  Outcome: Ongoing  Goal: Control of acute pain  Description: Control of acute pain  Outcome: Ongoing  Goal: Control of chronic pain  Description: Control of chronic pain  Outcome: Ongoing

## 2020-10-13 NOTE — PROGRESS NOTES
Occupational Therapy   Occupational Therapy Initial Assessment  Date: 10/13/2020   Patient Name: Vargas San  MRN: [id number]     : 10/6/1929    Date of Service: 10/13/2020    Discharge Recommendations:  3-5 sessions per week, Patient would benefit from continued therapy after discharge  OT Equipment Recommendations  Other: defer to next LOC  Vargas San scored a 9/24 on the AM-PAC ADL Inpatient form. Current research shows that an AM-PAC score of 17 or less is typically not associated with a discharge to the patient's home setting. Based on the patient's AM-PAC score and their current ADL deficits, it is recommended that the patient have 3-5 sessions per week of Occupational Therapy at d/c to increase the patient's independence. Please see assessment section for further patient specific details. If patient discharges prior to next session this note will serve as a discharge summary. Please see below for the latest assessment towards goals. Assessment   Performance deficits / Impairments: Decreased functional mobility ; Decreased ADL status; Decreased ROM; Decreased cognition;Decreased safe awareness;Decreased strength;Decreased endurance;Decreased balance  Assessment: Pt is a 81 yo M under observation following syncopal episode. PMHx: dementia, HLD, hypothyroidism. At baseline, pt is non-verbal and has aide services to assist with all fxl transfers and self-care activities, though reportedly able to ambulate using RW. This date pt required max Ax2 for all aspects of bed mobility, total A for changing his soiled gown. Unsafe to attempt any OOB activity as pt is currently unable to follow any commands and was very fatigued after sitting EOB. Will cont to see pt on acute to address the above deficits and maximize pt's fxl performance. Pt is far below his baseline and will benefit from continued skilled therapy 3-5 times/week at d/c.   Prognosis: Fair  Decision Making: Medium Complexity  History: see above  Exam: cognition, bed mobility, ADLs  Assistance / Modification: max-total Ax2  OT Education: OT Role;Orientation  Barriers to Learning: cognition  REQUIRES OT FOLLOW UP: Yes  Activity Tolerance  Activity Tolerance: Patient limited by fatigue;Treatment limited secondary to decreased cognition  Safety Devices  Safety Devices in place: Yes  Type of devices: Left in bed;Bed alarm in place;Call light within reach;Nurse notified; Patient at risk for falls           Patient Diagnosis(es): The primary encounter diagnosis was Syncope and collapse. A diagnosis of Urinary tract infection without hematuria, site unspecified was also pertinent to this visit. has a past medical history of Alzheimer disease ([facility] [address] Heart murmur, Hyperlipidemia, and Thyroid disease. has no past surgical history on file. Restrictions  Restrictions/Precautions  Restrictions/Precautions: Fall Risk  Position Activity Restriction  Other position/activity restrictions: IV, non-verbal    Subjective   General  Chart Reviewed: Yes  Patient assessed for rehabilitation services?: Yes  Additional Pertinent Hx: Per H&P: \"The patient Mitul Spivey is a 80 y.o.male with medical history significant for Alzheimer's disease heart murmur hyperlipidemia and hypothyroidism. Patient presented to the emergency room after having an episode of syncope at home. Patient is nonverbal and is unable to provide any history. Reportedly patient normally ambulates with a walker. He is a resident of extended care facility. He was being assisted out of the shower when he passed out. Patient did not have a seizure disorder\"  Family / Caregiver Present: No  Referring Practitioner: Wale De Leon  Diagnosis: syncope  Subjective  Subjective: pt met b/s for OT eval/tx with PT. pt nonverbal so unable to assess pain.  pt did wince in pain to R LE during bed mobility     Social/Functional History  Social/Functional History  Lives With: Spouse  Type of Home: Assisted living([facility])  Home Layout: One level  Home Access: Elevator  Bathroom Shower/Tub: Walk-in shower  Bathroom Toilet: Handicap height(RTS with rails)  Bathroom Equipment: Shower chair, Grab bars in shower, Toilet raiser(pt won't use shower chair)  Bathroom Accessibility: Accessible  Home Equipment: 4 wheeled walker, Lift chair  Receives Help From: Home health(HHA 3x/day for adl's, 2 hours in am, 1-3pm, 7-9 pm)  ADL Assistance: Needs assistance(HHA assists with shower, dressing. Wife or aid assists with toileting (pt also uses depends. Pt feeds himself.)  Homemaking Assistance: Needs assistance(Pt walks to dining room with assist of HHA for meals)  Homemaking Responsibilities: No  Ambulation Assistance: Needs assistance(with 2ZG)  Transfer Assistance: Needs assistance(HHA assist pt getting in/out of bed, out of chair)  Active : No  Patient's  Info: transportation through [employer] or pt's daughters both live in town  Additional Comments: Pt sleeps in regular bed.   pt non-verbal so above info taken from previous therapy note       Objective   Vision: Within Functional Limits(unable to assess)  Hearing: Within functional limits(unable to assess)    Orientation  Overall Orientation Status: Impaired(unable to assess, pt nonverbal)     Balance  Sitting Balance: Minimal assistance(CGA-min A, x15 mins EOB with posterior lean)  Standing Balance: Unable to assess(comment)(not safe to attempt at this time)  ADL  UE Dressing: Dependent/Total(to change gown)  Additional Comments: anticipate overall max-total A for all ADLs at this time  Tone RUE  RUE Tone: Normotonic  Tone LUE  LUE Tone: Normotonic  Coordination  Movements Are Fluid And Coordinated: Yes        Transfers  Transfer Comments: unable to assess secondary to pt unable to follow commands to safely attempt, very fatigued after sitting EOB x15 mins     Cognition  Overall Cognitive Status: Exceptions  Arousal/Alertness: Inconsistent responses to stimuli  Following Commands: Does not follow commands  Initiation: Requires cues for all  Sequencing: Requires cues for all  Cognition Comment: pt not following commands, advanced dementia and non-verbal at baseline                 LUE AROM (degrees)  LUE General AROM: unable to assess, pt not following commands and resistant/rigid to any movement  Left Hand AROM (degrees)  Left Hand General AROM: unable to assess, pt not following commands and resistant/rigid to any movement. pt tends to grasp any object in reach so has washcloth in his hand  RUE AROM (degrees)  RUE General AROM: unable to assess, pt not following commands and resistant/rigid to any movement  Right Hand AROM (degrees)  Right Hand General AROM: unable to assess, pt not following commands and resistant/rigid to any movement.  pt tends to grasp any object in reach so has washcloth in his hand  LUE Strength  LUE Strength Comment: unable to assess secondary to pt not following commands, resistant to any movement  RUE Strength  RUE Strength Comment: unable to assess secondary to pt not following commands, resistant to any movement                   Plan   Plan  Times per week: 3-5  Times per day: Daily  Current Treatment Recommendations: Strengthening, Functional Mobility Training, Endurance Training, Balance Training, Safety Education & Training, Self-Care / ADL, ROM, Patient/Caregiver Education & Training      AM-PAC Score        AM-[facility] Inpatient Daily Activity Raw Score: 9 (10/13/20 1629)  AM-PAC Inpatient ADL T-Scale Score : 25.33 (10/13/20 1629)  ADL Inpatient CMS 0-100% Score: 79.59 (10/13/20 1629)  ADL Inpatient CMS G-Code Modifier : CL (10/13/20 1629)    Goals  Short term goals  Time Frame for Short term goals: prior to d/c  Short term goal 1: Pt will complete bed mobility with mod Ax2  Short term goal 2: Pt will complete transfer to bedside chair using jonh stedy lift with mod Ax2  Short term goal 3: Pt will groom with min A  Short term goal 4: Pt will complete x10 reps B UE

## 2020-10-13 NOTE — CARE COORDINATION
10/13  Chart reviewed--noted patient from [address] [place] [address] where he lives with his wife. Per patient's wife- patient ambulated w/ walker prior to admission  Receives assist with bathing and ADLS--has aide services with [employer] 8am-10am  1pm-3pm  7pm-9pm.  Wife hopes for patient to return to apartment at discharge    Spoke w/ Es Moe with [facility] who confirms pt is from [address] [place] [address] at [facility] they would have a skilled bed if needed and also they do use [email or web address] if home care ordered.     Requested PT/OT mason from MD     Electronically signed by Bob Chavez on 10/13/2020 at 12:54 PM

## 2020-10-13 NOTE — CONSULTS
Neurology Consult Note  Reason for Consult: syncope    Chief complaint: limited-patient non verbal     Dr Dru Hannon MD asked me to see Shayy Carolina in consultation for evaluation of syncope    History of Present Illness:  Patient is non verbal at baseline. I obtained my information given conversation with the patients medical team and chart review. Shayy Carolina is a 80 y.o. male with hx of heart murmur, HLD, thyroid disease, alzheimer's dementia who presents for admission on 10/12/20 from nursing facility following a syncopal event. Per conversation with the patients wife Patient reportedly at baseline lives with his wife in independent living at [facility] and is non verbal and ambulates with a walker. She tells me that he has ha hx of vasovagal syncope and has similar events a couple times a month upon standing or when having a bowel movement. On the day of admission the patient was being cleaned up with the aid standing, when he had a \"cold sweat\" , his breathing abnormal as it was a \"raling sound\" which is not typical for his spells with brief loss of consciousness. He did not fall and was assisted to the toilet. He regained consciousness after a few minutes and appeared to be grossly at baseline. Denies any shaking and the patient is incontinent at baseline, uncertain of any tongue trauma. She states that he did have pneumonia several weeks ago and has had some breathing issues at times since then. Upon chart review  3/2019 patient was evaluated at OSH for possible seizures following activity of generalized convulsions that lasted 5-10 minutes. Per wife Jose Enrique Curls was discussed and not started at that time. Any further information pertinent to H&P is limited given patients encephalopathic state. Medical History:  Past Medical History:   Diagnosis Date    Alzheimer disease ([facility] [address]     Heart murmur     Hyperlipidemia     Thyroid disease      History reviewed.  No pertinent surgical history. Scheduled Meds:   aspirin  81 mg Oral Daily    finasteride  5 mg Oral Daily    levothyroxine  175 mcg Oral Daily    cetirizine  5 mg Oral Daily    therapeutic multivitamin-minerals  1 tablet Oral Daily    pravastatin  40 mg Oral Nightly    Vitamin D  5,000 Units Oral Daily    sodium chloride flush  10 mL Intravenous 2 times per day    enoxaparin  40 mg Subcutaneous Nightly    famotidine  20 mg Oral Nightly     Continuous Infusions:   sodium chloride 100 mL/hr at 10/13/20 0240     PRN Meds:.polyethylene glycol, sodium chloride flush, potassium chloride **OR** potassium alternative oral replacement **OR** potassium chloride, acetaminophen **OR** acetaminophen, polyethylene glycol, promethazine **OR** ondansetron    Medications Prior to Admission: Multiple Vitamins-Minerals (THERAPEUTIC MULTIVITAMIN-MINERALS) tablet, Take 1 tablet by mouth daily  aspirin 81 MG EC tablet, Take 81 mg by mouth daily  vitamin D (CHOLECALCIFEROL) 25 MCG (1000 UT) TABS tablet, Take 5,000 Units by mouth daily  loratadine (CLARITIN) 10 MG tablet, Take 10 mg by mouth daily as needed (allergies)  levothyroxine (SYNTHROID) 175 MCG tablet, Take 175 mcg by mouth Daily  pravastatin (PRAVACHOL) 40 MG tablet, Take 40 mg by mouth nightly  polyethylene glycol (MIRALAX) PACK packet, Take 17 g by mouth 2 times daily as needed (constipation)   finasteride (PROSCAR) 5 MG tablet, Take 5 mg by mouth daily    No Known Allergies    History reviewed. No pertinent family history.     Social History     Tobacco Use   Smoking Status Never Smoker   Smokeless Tobacco Never Used     Social History     Substance and Sexual Activity   Drug Use Not on file     Social History     Substance and Sexual Activity   Alcohol Use No       ROS:  Limited given non verbal.     Exam:  Vitals:    10/12/20 1815 10/12/20 1939 10/13/20 0044 10/13/20 0514   BP: (!) 146/82 (!) 143/73 (!) 158/75 (!) 159/68   Pulse: 62 68 65 73   Resp: 15 20 15 17   Temp: 98.1 °F (36.7 °C) 98.3 °F (36.8 °C) 98.5 °F (36.9 °C) 97.2 °F (36.2 °C)   TempSrc: Oral Oral Oral Oral   SpO2: 100% 93% 96% 99%   Weight:    187 lb 2.7 oz (84.9 kg)   Height:          Constitutional    Vital signs: BP, HR, and RR reviewed   General Alert, no distress, well-nourished  Eyes: unable to visualize the fundi  Cardiovascular: pulses symmetric in all 4 extremities. No peripheral edema. Psychiatric: No psychotic behavior noted. Neurologic  Mental status:   orientation limited, non verbal r/t Alzheimer disease. General fund of knowledge:   limited, non verbal r/t Alzheimer disease. Memory:  limited, non verbal r/t Alzheimer disease. Attention  Appear alert. Will track and regard. Language  limited, non verbal r/t Alzheimer disease. Comprehension  limited, non verbal r/t Alzheimer disease. Does not follow commands. Cranial nerves:   CN2: Limited assessment. Visual Fields appear to full as patient blinks to threat bilaterally. CN 3,4,6: extraocular muscles intact. Pupillary light assessment limited given cooperation. CN5: V1: V2: V3: limited given static encephalopathy. CN7: upper and lower facial symmetric. Patient non verbal at baseline. CN8: limited given static encephalopathy. CN9/10: limited given static encephalopathy. CN11: limited given static encephalopathy. CN12: limited given static encephalopathy. Strength: limited given static encephalopathy. BUE: grossly 2/5. Purposeful movement but does not follow. LLE: Minimal distal movement observed. Appears to have left foot drop. 3/5 hip flexion, purposeful. RLE: Purposeful movement. Proximal 3/5, distal 2/5. Deep tendon reflexes:   R Bicep: 3+  R Brachioradialis: 3+  R Patellar: grossly areflexic     L Bicep 3+ L Brachioradialis[de-identified] 3+  L Patellar: grossly areflexic     Sensory: grimaces to pain in 4/4 extremities. Cerebellar/coordination: limited given static encephalopathy. Tone: increased throughout.    Gait: deferred for safety. Labs  Na: 137  K: 3.6  BUN: 14  Creatinine: 1.0  Glucose: 102  Calcium: 8.9    Troponin: <0.01    WBC: 5.3  RBC: 3.94  Hgb: 12.9  Hct: 37.8  Platelets: 357    UA: large leukocyte esterase, negative for nitrites. >900 WBC  Urine Culture: Pending    Studies  CT Head w/o 10/12/20: Reviewed the read. No hemorrhage or mass identified. Atrophy and small-vessel ischemic change, similar to prior     CTA Head & Neck w/ 7/4/20: Reviewed the read. 1. No acute intracranial abnormality. 2. No large vessel occlusion. 3. Severe chronic frontotemporal atrophy which can be seen with frontotemporal dementia. Routine EEG 3/2019: IMPRESSION:   This EEG shows mild to moderate generalized slowing. There is no   evidence of epileptiform activities during the recording. Impression  1. Syncope  2. UTI  3. Alzheimer's Disease. 4. ? LLE weakness/pain  5. HLD  6. HX of vasovagal syncope    Minesh Hanks is a 80 y.o. male with history of alzheimer's dementia, heart murmur, HLD, thyroid disease, vasovagal syncope who presents with syncopal event, different from typical as had \"reported raling breathing sound\" UTI found on admission. Exam limited given end stage alzheimer's, questionable pain/weakness of LLE. Otherwise no gross findings. Etiology: Likely Syncope, cannot fully exclude seizure given hx of described GTC. Recommendations  - No further Neurological work up at this time. - Could trial low dose keppra given multiple recurrent events and hx of GTC type spell. Discussed with wife. - Obtain orthostatics. - Recommend investigation into possible LLE ? weakness/pain per primary team.   - Infectious workup/management per primary team.   - Will discuss case with neurology attending this afternoon.      Nancy Rodriguez, [address] Neurology    A copy of this note was provided for Dr Alec Levy MD

## 2020-10-13 NOTE — PROGRESS NOTES
Occupational Therapy Attempt  Rick Last    OT orders received and chart reviewed. Attempted to see the pt for therapy initial evaluation but was unable to secondary to pt off floor for Echo. Will follow up as schedule permits.      Electronically signed by Xu Carreon OT on 10/13/20 at 2:58 PM EDT

## 2020-10-13 NOTE — PROGRESS NOTES
Physical Therapy  PT attempt  Shayy Carolina  Attempted PT eval and treat this PM.  Pt out of the room at [place]. Will attempt again as schedule permits.     Electronically signed by Kareem Perez, PT [id number] on 10/13/2020 at 2:57 PM

## 2020-10-14 LAB
ANION GAP SERPL CALCULATED.3IONS-SCNC: 10 MMOL/L (ref 3–16)
BUN BLDV-MCNC: 15 MG/DL (ref 7–20)
CALCIUM SERPL-MCNC: 8.6 MG/DL (ref 8.3–10.6)
CHLORIDE BLD-SCNC: 105 MMOL/L (ref 99–110)
CO2: 23 MMOL/L (ref 21–32)
CREAT SERPL-MCNC: 1 MG/DL (ref 0.8–1.3)
GFR AFRICAN AMERICAN: >60
GFR NON-AFRICAN AMERICAN: >60
GLUCOSE BLD-MCNC: 90 MG/DL (ref 70–99)
HCT VFR BLD CALC: 39.1 % (ref 40.5–52.5)
HEMOGLOBIN: 13 G/DL (ref 13.5–17.5)
MCH RBC QN AUTO: 32.3 PG (ref 26–34)
MCHC RBC AUTO-ENTMCNC: 33.3 G/DL (ref 31–36)
MCV RBC AUTO: 97.1 FL (ref 80–100)
PDW BLD-RTO: 13.6 % (ref 12.4–15.4)
PLATELET # BLD: 207 K/UL (ref 135–450)
PMV BLD AUTO: 8.3 FL (ref 5–10.5)
POTASSIUM SERPL-SCNC: 3.9 MMOL/L (ref 3.5–5.1)
RBC # BLD: 4.03 M/UL (ref 4.2–5.9)
SARS-COV-2, NAAT: NOT DETECTED
SODIUM BLD-SCNC: 138 MMOL/L (ref 136–145)
WBC # BLD: 5.6 K/UL (ref 4–11)

## 2020-10-14 PROCEDURE — 80048 BASIC METABOLIC PNL TOTAL CA: CPT

## 2020-10-14 PROCEDURE — G0378 HOSPITAL OBSERVATION PER HR: HCPCS

## 2020-10-14 PROCEDURE — 97530 THERAPEUTIC ACTIVITIES: CPT

## 2020-10-14 PROCEDURE — 96366 THER/PROPH/DIAG IV INF ADDON: CPT

## 2020-10-14 PROCEDURE — 85027 COMPLETE CBC AUTOMATED: CPT

## 2020-10-14 PROCEDURE — U0002 COVID-19 LAB TEST NON-CDC: HCPCS

## 2020-10-14 PROCEDURE — 6370000000 HC RX 637 (ALT 250 FOR IP): Performed by: HOSPITALIST

## 2020-10-14 PROCEDURE — 6370000000 HC RX 637 (ALT 250 FOR IP): Performed by: INTERNAL MEDICINE

## 2020-10-14 PROCEDURE — 94760 N-INVAS EAR/PLS OXIMETRY 1: CPT

## 2020-10-14 PROCEDURE — 2580000003 HC RX 258: Performed by: HOSPITALIST

## 2020-10-14 PROCEDURE — 6360000002 HC RX W HCPCS: Performed by: HOSPITALIST

## 2020-10-14 PROCEDURE — 36415 COLL VENOUS BLD VENIPUNCTURE: CPT

## 2020-10-14 PROCEDURE — 96372 THER/PROPH/DIAG INJ SC/IM: CPT

## 2020-10-14 RX ORDER — CIPROFLOXACIN 500 MG/1
500 TABLET, FILM COATED ORAL 2 TIMES DAILY
Qty: 10 TABLET | Refills: 0
Start: 2020-10-14 | End: 2020-10-16 | Stop reason: SDUPTHER

## 2020-10-14 RX ADMIN — PRAVASTATIN SODIUM 40 MG: 10 TABLET ORAL at 21:54

## 2020-10-14 RX ADMIN — FINASTERIDE 5 MG: 5 TABLET, FILM COATED ORAL at 09:56

## 2020-10-14 RX ADMIN — Medication 5000 UNITS: at 09:56

## 2020-10-14 RX ADMIN — LEVOTHYROXINE SODIUM 175 MCG: 0.1 TABLET ORAL at 06:11

## 2020-10-14 RX ADMIN — SODIUM CHLORIDE, PRESERVATIVE FREE 10 ML: 5 INJECTION INTRAVENOUS at 21:55

## 2020-10-14 RX ADMIN — ASPIRIN 81 MG: 81 TABLET, COATED ORAL at 09:56

## 2020-10-14 RX ADMIN — FAMOTIDINE 20 MG: 20 TABLET, FILM COATED ORAL at 21:54

## 2020-10-14 RX ADMIN — MULTIPLE VITAMINS W/ MINERALS TAB 1 TABLET: TAB at 09:56

## 2020-10-14 RX ADMIN — CEFTRIAXONE 1 G: 1 INJECTION, POWDER, FOR SOLUTION INTRAMUSCULAR; INTRAVENOUS at 11:07

## 2020-10-14 RX ADMIN — ENOXAPARIN SODIUM 40 MG: 40 INJECTION SUBCUTANEOUS at 21:55

## 2020-10-14 RX ADMIN — CETIRIZINE HYDROCHLORIDE 5 MG: 10 TABLET, FILM COATED ORAL at 09:56

## 2020-10-14 NOTE — DISCHARGE INSTR - COC
Continuity of Care Form    Patient Name: Diane Nash   :  10/6/1929  MRN:  [id number]    Admit date:  10/12/2020  Discharge date:  10/14/2020    Code Status Order: Full Code   Advance Directives:      Admitting Physician:  Olena Hernandez MD  PCP: No primary care provider on file. Discharging Nurse: El[facility] Unit/Room#: G9P-4279/9798-16  Discharging Unit Phone Number: [phone]    Emergency Contact:   Extended Emergency Contact Information  Primary Emergency Contact: Yumiko Angulo   [address] Phone: [phone]  Relation: Child  Secondary Emergency Contact: Gavi Harvey  [place] Phone: [phone]  Relation: Spouse    Past Surgical History:  History reviewed. No pertinent surgical history. Immunization History: There is no immunization history on file for this patient.     Active Problems:  Patient Active Problem List   Diagnosis Code    AMS (altered mental status) R41.82    Syncope and collapse R55       Isolation/Infection:   Isolation            No Isolation          Patient Infection Status       None to display            Nurse Assessment:  Last Vital Signs: BP (!) 154/78   Pulse 73   Temp 97.9 °F (36.6 °C)   Resp 18   Ht 6' (1.829 m)   Wt 186 lb 11.7 oz (84.7 kg)   SpO2 100%   BMI 25.33 kg/m²     Last documented pain score (0-10 scale): Pain Level: (facc)  Last Weight:   Wt Readings from Last 1 Encounters:   10/14/20 186 lb 11.7 oz (84.7 kg)     Mental Status:  disoriented and alert    IV Access:  - None    Nursing Mobility/ADLs:  Walking   Dependent  Transfer  Dependent  Bathing  Dependent  Dressing  Dependent  Toileting  Dependent  Feeding  Assisted  Med Admin  Assisted  Med Delivery   crushed and prefers mixed with applesauce- patient tries to chew meds if not crushed    Wound Care Documentation and Therapy:        Elimination:  Continence:   · Bowel: No  · Bladder: No  Urinary Catheter: None   Colostomy/Ileostomy/Ileal Conduit: No Date of Last BM: 10/14    Intake/Output Summary (Last 24 hours) at 10/14/2020 1125  Last data filed at 10/14/2020 1108  Gross per 24 hour   Intake 2855 ml   Output --   Net 2855 ml     I/O last 3 completed shifts: In: 3177 [P.O.:480; I.V.:2255]  Out: -     Safety Concerns: At Risk for Falls and alzheimer's disease    Impairments/Disabilities:      Language Barrier - patient essentially nonverbal, says \"yes and no\" and Hearing    Nutrition Therapy:  Current Nutrition Therapy:   - Oral Diet:  General    Routes of Feeding: Oral  Liquids: No Restrictions  Daily Fluid Restriction: no  Last Modified Barium Swallow with Video (Video Swallowing Test): not done    Treatments at the Time of Hospital Discharge:   Respiratory Treatments: none  Oxygen Therapy:  is not on home oxygen therapy.   Ventilator:    - No ventilator support    Rehab Therapies: Physical Therapy and Occupational Therapy  Weight Bearing Status/Restrictions: No weight bearing restirctions  Other Medical Equipment (for information only, NOT a DME order):  Stedy, maximove/lift  Other Treatments: none    Patient's personal belongings (please select all that are sent with patient):  None    RN SIGNATURE:  Electronically signed by Erica Tomlin RN on 10/14/20 at 11:33 AM EDT    CASE MANAGEMENT/SOCIAL WORK SECTION    Inpatient Status Date: Inpatient 10/15/2020    Readmission Risk Assessment Score:  Readmission Risk              Risk of Unplanned Readmission:        0           Discharging to Facility/ Agency   · Name: [facility]  · Address:  · Phone:[phone]  · [phone]      / signature:Electronically signed by Cardell Hatchet on 10/14/2020 at 11:26 AM     PHYSICIAN SECTION    Prognosis: Good    Condition at Discharge: Stable    Rehab Potential (if transferring to Rehab): Good    Recommended Labs or Other Treatments After Discharge: ot/pt    Physician Certification: I certify the above information and transfer of Ashlyn Bennett Duane Carton  is necessary for the continuing treatment of the diagnosis listed and that he requires [place] for less 30 days.      Update Admission H&P: No change in H&P    PHYSICIAN SIGNATURE:  Electronically signed by Cb Valdivia MD on 10/16/20 at 12:41 PM EDT

## 2020-10-14 NOTE — PROGRESS NOTES
Physical Therapy  Facility/Department: [address] MED SURG  Daily Treatment Note  NAME: Earnest Luciano  : 10/6/1929  MRN: [id number]    Date of Service: 10/14/2020    Discharge Recommendations:  Earnest Luciano scored a  on the AM-PAC short mobility form. Current research shows that an AM-PAC score of 17 or less is typically not associated with a discharge to the patient's home setting. Based on the patient's AM-PAC score and their current functional mobility deficits, it is recommended that the patient have 3-5 sessions per week of Physical Therapy at d/c to increase the patient's independence. Please see assessment section for further patient specific details. If patient discharges prior to next session this note will serve as a discharge summary. Please see below for the latest assessment towards goals. 3-5 sessions per week, Patient would benefit from continued therapy after discharge   PT Equipment Recommendations  Equipment Needed: No  Other: defer to next level of care    Assessment   Body structures, Functions, Activity limitations: Decreased functional mobility   Assessment: Pt requiring totalAx2 for all functional mobility this session due to poor command following and resistance to movement. Pt grabbing onto therapist's arms/hands throughout session, requiring increased time for hand placement and mobility for pericare and brief management. Pt continues to be functioning below his baseline and will continue to benefit from skilled therapy to maximize safety and independence. Pt will likely require increased assist (possibly of 2) and lift equipment for bed to chair transfer. Treatment Diagnosis: functional mobility below baseline  Patient Education: bed mobility  REQUIRES PT FOLLOW UP: Yes  Activity Tolerance  Activity Tolerance: Patient limited by cognitive status; Patient limited by endurance; Patient limited by fatigue  Activity Tolerance: pt continues to have difficulty to command follow, hands/arms during attempts at rolling, requiring increased time for cues and HOHA for hand placement. Pt visibly fearful during mobility, requiring heavy cues and emotional support. TotalA for pericare, brief management, and gown management after being saturated with urine upon arrival.  Transfers  Comment: unsafe to attempt OOB activity, pt resisting rolling to R and L in bed for pericare, unable to command follow for supine to sit transfer  Ambulation  Ambulation?: No                                 G-Code     OutComes Score                                                     AM-PAC Score  AM-PAC Inpatient Mobility Raw Score : 6 (10/14/20 1600)  AM-PAC Inpatient T-Scale Score : 23.55 (10/14/20 1600)  Mobility Inpatient CMS 0-100% Score: 100 (10/14/20 1600)  Mobility Inpatient CMS G-Code Modifier : CN (10/14/20 1600)          Goals  Short term goals  Time Frame for Short term goals: upon d/c -ongoing  Short term goal 1: bed mobility modA x2  Short term goal 2: transfer to bedside chair via [address] [place] [address] stedy with assist of 2  Short term goal 3: assess ambulation when appropriate  Patient Goals   Patient goals : pt unable to state goals due to cognition    Plan    Plan  Times per week: 3-5x/wk  Current Treatment Recommendations: Functional Mobility Training  Safety Devices  Type of devices: All fall risk precautions in place, Bed alarm in place, Call light within reach, Left in bed, Nurse notified  Restraints  Initially in place: No     Therapy Time   Individual Concurrent Group Co-treatment   Time In 1456         Time Out 1520         Minutes 24              Timed Code Treatment Minutes:  24    Total Treatment Minutes:  VERONICA Gayle    This note to serve as discharge summary if patient discharged before next session.

## 2020-10-14 NOTE — PROGRESS NOTES
Hospitalist Progress Note  10/14/2020 9:35 AM    PCP: No primary care provider on file. [phone]     Date of Admission: 10/12/2020                                                                                                                     HOSPITAL COURSE    Patient demographics:  The patient  Izabela Sanchez is a 80 y.o. male     Significant past medical history:   Patient Active Problem List   Diagnosis    AMS (altered mental status)    Syncope and collapse         Presenting symptoms:  Loss of Consciousness    Diagnostic workup:      CONSULTS DURING ADMISSION :   IP CONSULT TO NEUROLOGY      Patient was diagnosed with:  Syncope  Urinary tract infection  Generalized weakness      Treatment while inpatient:  80years old gentleman with history of a aphasia and dementia. Patient presented to the emergency room after having an episode of syncope at home. Wife reports that patient has a history of recurrent syncopes for which exact etiology is not known                 she reports that patient has never been seen with seizure disorder. Patient was diagnosed with urinary tract infection and treated with antibiotics. Patient has urinary incontinence. Patient will be discharged home on 5 days of ciprofloxacin. Patient's echocardiogram showed normal ejection fraction mild aortic stenosis and regurgitation. Patient will be referred to see cardiology as an outpatient.                                                                  ----------------------------------------------------------      SUBJECTIVE COMPLAINTS-  Follow up for Syncope     Diet: DIET GENERAL;      OBJECTIVE:   Patient Active Problem List   Diagnosis    AMS (altered mental status)    Syncope and collapse       Allergies  Patient has no known allergies.     Medications    Scheduled Meds:   aspirin  81 mg Oral Daily    finasteride  5 mg Oral Daily    levothyroxine  175 mcg Oral Daily    cetirizine  5 mg Oral Daily    therapeutic multivitamin-minerals  1 tablet Oral Daily    pravastatin  40 mg Oral Nightly    Vitamin D  5,000 Units Oral Daily    sodium chloride flush  10 mL Intravenous 2 times per day    enoxaparin  40 mg Subcutaneous Nightly    famotidine  20 mg Oral Nightly     Continuous Infusions:   sodium chloride 100 mL/hr at 10/13/20 2303     PRN Meds:  polyethylene glycol, sodium chloride flush, potassium chloride **OR** potassium alternative oral replacement **OR** potassium chloride, acetaminophen **OR** acetaminophen, polyethylene glycol, promethazine **OR** ondansetron    Vitals   Vitals /wt   Patient Vitals for the past 8 hrs:   BP Temp Temp src Pulse Resp SpO2 Weight   10/14/20 0823 -- -- -- -- -- 95 % --   10/14/20 0430 (!) 169/79 97.3 °F (36.3 °C) Oral 68 16 93 % 186 lb 11.7 oz (84.7 kg)        72HR INTAKE/OUTPUT:      Intake/Output Summary (Last 24 hours) at 10/14/2020 0935  Last data filed at 10/14/2020 0430  Gross per 24 hour   Intake 2735 ml   Output --   Net 2735 ml       Exam:    Gen:   Alert and oriented × patient is nonverbal  Eyes: PERRL. No sclera icterus. No conjunctival injection. ENT: No discharge. Pharynx clear. External appearance of ears and nose normal.  Neck: Trachea midline. No obvious mass. Resp: No accessory muscle use. No crackles. No wheezes. No rhonchi. CV: Regular rate. Regular rhythm. No murmur or rub. No edema. GI: Non-tender. Non-distended. No hernia. Skin: Warm, dry, normal texture and turgor. Lymph: No cervical LAD. No supraclavicular LAD. M/S: / Ext. No cyanosis. No clubbing. No joint deformity. Neuro: CN 2-12 are intact,  no neurologic deficits noted. PT/INR: No results for input(s): PROTIME, INR in the last 72 hours. APTT: No results for input(s): APTT in the last 72 hours.     CBC:   Recent Labs     10/12/20  0953 10/13/20  0709 10/14/20  0716   WBC 5.9 5.3 5.6   HGB 12.0* 12.9* 13.0*   HCT 36.1* 37.8* 39.1*   MCV 97.9 95.8 97.1    204 207 BMP:   Recent Labs     10/12/20  0953 10/13/20  0709 10/14/20  0716    137 138   K 4.0 3.6 3.9    104 105   CO2 22 25 23   BUN 18 14 15   CREATININE 1.2 1.0 1.0       LIVER PROFILE: No results for input(s): ALKPHOS, AST, ALT, ALB, BILIDIR, BILITOT, ALKPHOS in the last 72 hours. No results for input(s): AMYLASE in the last 72 hours. No results for input(s): LIPASE in the last 72 hours. UA:  Recent Labs     10/12/20  1103   WBCUA >900*   RBCUA 13*       TROPONIN:   Recent Labs     10/12/20  0953   TROPONINI <0.01       Lab Results   Component Value Date/Time    URRFLXCULT Yes 10/12/2020 11:03 AM       No results for input(s): TSHREFLEX in the last 72 hours. No components found for: [id number]  POC GLUCOSE:    Recent Labs     10/12/20  0957   POCGLU 115*     No results for input(s): LABA1C in the last 72 hours. No results found for: LABA1C      ASSESSMENT AND PLAN  Syncope R55  Likely due to urinary tract infection  Blood pressure is stable  Discontinue  Iv  fluids  Monitor on telemetry  CT scan of the head is negative  Neurology consult is appreciated  Echocardiogram is pending    Urinary tract infection  Continue ceftriaxone pending culture sensitivity   if  No  Growth  On  cultures  Patient  Can  Be  Discharged  On  ciprofloxacin      Hypothyroidism  E03.9  Continue on Synthroid        Hyperlipidemia  E78.5  No current evidence of Rhabdomyolysis or other adverse effects. Continue statin therapy while in the hospital        DVT and GI prophylaxis    OT PT    Code Status: Full Code        Dispo - patient  Waiting  For  precertification      The patient and / or the family were informed of the results of any tests, a time was given to answer questions, a plan was proposed and they agreed with plan. Elizabeth Spears MD    This note was transcribed using [id number] Whi. Please disregard any translational errors.

## 2020-10-14 NOTE — CARE COORDINATION
62/05  Await precert for discharge.   Electronically signed by Filiberto Barth on 10/14/2020 at 4:38 PM

## 2020-10-14 NOTE — CARE COORDINATION
10/14 discharge order - noted PT/OT rec for SNF--spoke w/ wife Shivam Anaya ph# [phone]--DVY is agreeable to SNF at [facility] w/ Amara Chavez at [facility] -#[phone]--TXD will initiate precert. COVID test ordered.   Electronically signed by Tito Luis on 10/14/2020 at 12:04 PM

## 2020-10-14 NOTE — PROGRESS NOTES
Occupational Therapy  Facility/Department: [address] MED SURG  Daily Treatment Note  NAME: Sarah Cook  : 10/6/1929  MRN: [id number]    Date of Service: 10/14/2020    Discharge Recommendations:  3-5 sessions per week, Patient would benefit from continued therapy after discharge       Assessment   Performance deficits / Impairments: Decreased functional mobility ; Decreased ADL status; Decreased ROM; Decreased cognition;Decreased safe awareness;Decreased strength;Decreased endurance;Decreased balance  Assessment: Pt is a 79 yo M under observation following syncopal episode. PMHx: dementia, HLD, hypothyroidism. At baseline, pt is non-verbal and has aide services to assist with all fxl transfers and self-care activities, though reportedly able to ambulate using RW. This date pt required dependence x2 for all aspects of bed mobility, total A for changing his soiled gown, and depends. Unsafe to attempt any OOB activity as pt is currently unable to follow any commands and resistant to movement. Will cont to see pt on acute to address the above deficits and maximize pt's fxl performance. Pt is far below his baseline and will benefit from continued skilled therapy 3-5 times/week at d/c. Prognosis: Fair  REQUIRES OT FOLLOW UP: Yes  Activity Tolerance  Activity Tolerance: Treatment limited secondary to decreased cognition  Safety Devices  Type of devices: Call light within reach; Bed alarm in place; Left in bed         Patient Diagnosis(es): The primary encounter diagnosis was Syncope and collapse. A diagnosis of Urinary tract infection without hematuria, site unspecified was also pertinent to this visit. has a past medical history of Alzheimer disease ([facility] [address] Heart murmur, Hyperlipidemia, and Thyroid disease. has no past surgical history on file.     Restrictions  Restrictions/Precautions  Restrictions/Precautions: Fall Risk  Position Activity Restriction  Other position/activity restrictions: IV, non-verbal  Subjective General  Chart Reviewed: Yes  Patient assessed for rehabilitation services?: Yes  Additional Pertinent Hx: Per H&P: \"The patient Yahir Sousa is a 80 y.o.male with medical history significant for Alzheimer's disease heart murmur hyperlipidemia and hypothyroidism. Patient presented to the emergency room after having an episode of syncope at home. Patient is nonverbal and is unable to provide any history. Reportedly patient normally ambulates with a walker. He is a resident of extended care facility. He was being assisted out of the shower when he passed out. Patient did not have a seizure disorder\"  Family / Caregiver Present: Yes(Pt's wife.)  Referring Practitioner: Thad Reyes  Diagnosis: syncope  Subjective  Subjective: Pt seen bedside with PT for a cotx. Pt nonverbal so unable to assess pain. Objective    ADL  Toileting: Dependent/Total(Pt's sheets were soiled with urine. Pt required dependent x 2 for changing the brief and clean up.)        Functional Mobility  Functional Mobility Comments: Unsafe to attempt at this time as pt is resistant to movement. Bed mobility  Rolling to Left: Dependent/Total;2 Person assistance  Rolling to Right: Dependent/Total;2 Person assistance  Scooting: Dependent/Total;2 Person assistance  Comment: Pt resitant to movements and grabbing for objects in reach with both hands. Pt required assist to roll to both sides to change bed sheets.         Plan   Plan  Times per week: 3-5  Times per day: Daily  Current Treatment Recommendations: Strengthening, Functional Mobility Training, Endurance Training, Balance Training, Safety Education & Training, Self-Care / ADL, ROM, Patient/Caregiver Education & Training    AM-PAC Score        AM-[facility] Inpatient Daily Activity Raw Score: 9 (10/14/20 1530)  AM-PAC Inpatient ADL T-Scale Score : 25.33 (10/14/20 1530)  ADL Inpatient CMS 0-100% Score: 79.59 (10/14/20 1530)  ADL Inpatient CMS G-Code Modifier : CL (10/14/20 1530)    Goals  Short term goals  Time Frame for Short term goals: prior to d/c  Short term goal 1: Pt will complete bed mobility with mod Ax2  Short term goal 2: Pt will complete transfer to bedside chair using jonh stedy lift with mod Ax2  Short term goal 3: Pt will groom with min A  Short term goal 4: Pt will complete x10 reps B UE therex in order to increase strength/endurance for ADLs  Long term goals  Time Frame for Long term goals : LTG=STG  Patient Goals   Patient goals : pt is non-verbal so unable to indicate a goal       Therapy Time   Individual Concurrent Group Co-treatment   Time In 1450         Time Out 1530         Minutes 40              This note to serve as OT d/c summary if pt is d/c-ed from hospital prior to next OT session.       Clive Irving, [address]

## 2020-10-15 PROCEDURE — 1200000000 HC SEMI PRIVATE

## 2020-10-15 PROCEDURE — G0378 HOSPITAL OBSERVATION PER HR: HCPCS

## 2020-10-15 PROCEDURE — 96372 THER/PROPH/DIAG INJ SC/IM: CPT

## 2020-10-15 PROCEDURE — 6370000000 HC RX 637 (ALT 250 FOR IP): Performed by: HOSPITALIST

## 2020-10-15 PROCEDURE — 96366 THER/PROPH/DIAG IV INF ADDON: CPT

## 2020-10-15 PROCEDURE — 6360000002 HC RX W HCPCS: Performed by: HOSPITALIST

## 2020-10-15 PROCEDURE — 6370000000 HC RX 637 (ALT 250 FOR IP): Performed by: INTERNAL MEDICINE

## 2020-10-15 PROCEDURE — 2580000003 HC RX 258: Performed by: HOSPITALIST

## 2020-10-15 PROCEDURE — 94760 N-INVAS EAR/PLS OXIMETRY 1: CPT

## 2020-10-15 RX ORDER — LEVETIRACETAM 5 MG/ML
500 INJECTION INTRAVASCULAR EVERY 12 HOURS
Status: DISCONTINUED | OUTPATIENT
Start: 2020-10-15 | End: 2020-10-15

## 2020-10-15 RX ORDER — LEVETIRACETAM 100 MG/ML
500 SOLUTION ORAL 2 TIMES DAILY
Status: DISCONTINUED | OUTPATIENT
Start: 2020-10-15 | End: 2020-10-16 | Stop reason: HOSPADM

## 2020-10-15 RX ORDER — LEVETIRACETAM 500 MG/1
500 TABLET ORAL 2 TIMES DAILY
Status: DISCONTINUED | OUTPATIENT
Start: 2020-10-15 | End: 2020-10-15 | Stop reason: SDUPTHER

## 2020-10-15 RX ADMIN — SODIUM CHLORIDE, PRESERVATIVE FREE 10 ML: 5 INJECTION INTRAVENOUS at 08:49

## 2020-10-15 RX ADMIN — PRAVASTATIN SODIUM 40 MG: 10 TABLET ORAL at 22:15

## 2020-10-15 RX ADMIN — ENOXAPARIN SODIUM 40 MG: 40 INJECTION SUBCUTANEOUS at 21:50

## 2020-10-15 RX ADMIN — CETIRIZINE HYDROCHLORIDE 5 MG: 10 TABLET, FILM COATED ORAL at 08:42

## 2020-10-15 RX ADMIN — CEFTRIAXONE 1 G: 1 INJECTION, POWDER, FOR SOLUTION INTRAMUSCULAR; INTRAVENOUS at 11:17

## 2020-10-15 RX ADMIN — Medication 5000 UNITS: at 08:42

## 2020-10-15 RX ADMIN — ASPIRIN 81 MG: 81 TABLET, COATED ORAL at 08:42

## 2020-10-15 RX ADMIN — FAMOTIDINE 20 MG: 20 TABLET, FILM COATED ORAL at 21:50

## 2020-10-15 RX ADMIN — LEVOTHYROXINE SODIUM 175 MCG: 0.1 TABLET ORAL at 05:41

## 2020-10-15 RX ADMIN — MULTIPLE VITAMINS W/ MINERALS TAB 1 TABLET: TAB at 08:41

## 2020-10-15 RX ADMIN — FINASTERIDE 5 MG: 5 TABLET, FILM COATED ORAL at 08:42

## 2020-10-15 RX ADMIN — SODIUM CHLORIDE, PRESERVATIVE FREE 10 ML: 5 INJECTION INTRAVENOUS at 22:06

## 2020-10-15 ASSESSMENT — PAIN SCALES - GENERAL: PAINLEVEL_OUTOF10: 0

## 2020-10-15 ASSESSMENT — PAIN SCALES - WONG BAKER
WONGBAKER_NUMERICALRESPONSE: 0
WONGBAKER_NUMERICALRESPONSE: 0

## 2020-10-15 NOTE — CARE COORDINATION
Miguelina spoke with Es Moe at [place] regarding precert--no precert as of yet. Miguelina set up [address] for 6pm, pending precert.      Electronically signed by Bisi Guerrero on 10/15/2020 at 9:50 AM

## 2020-10-15 NOTE — CARE COORDINATION
Sw received call from [facility]. She reported that [facility] has intent to deny skilled level of care. The attending hospital physician can appeal this decision by completing a PEER TO PEER by Jenny Fisher on 10/16/2020--call [phone]. Sw sent message to attending regarding above. Sw explained above to patient's spouse, Davida Garcia ([phone]). Sw reviewed therapy recommendations and that patient was requiring the assist of two for all mobility. Sw discussed options of family caring for patient, private duty 24 hour care and long term care options. Spouse stated that she would need to discuss plans with the family and let Sw know decision on 10/16/20. Sw cancelled [facility]  for 6pm.   Rn aware.      Electronically signed by Dana Rodriguez on 10/15/2020 at 3:47 PM

## 2020-10-15 NOTE — PLAN OF CARE
Problem: Skin Integrity:  Goal: Will show no infection signs and symptoms  Description: Will show no infection signs and symptoms  Outcome: Ongoing     Problem: Skin Integrity:  Goal: Absence of new skin breakdown  Description: Absence of new skin breakdown  Outcome: Ongoing     Problem: Falls - Risk of:  Goal: Will remain free from falls  Description: Will remain free from falls  Outcome: Ongoing     Problem: Falls - Risk of:  Goal: Absence of physical injury  Description: Absence of physical injury  Outcome: Ongoing     Problem: Pain:  Goal: Pain level will decrease  Description: Pain level will decrease  Outcome: Ongoing     Problem: Pain:  Goal: Control of acute pain  Description: Control of acute pain  Outcome: Ongoing     Problem: Pain:  Goal: Control of chronic pain  Description: Control of chronic pain  Outcome: Ongoing

## 2020-10-15 NOTE — PROGRESS NOTES
Hospitalist Progress Note  10/15/2020 9:38 AM    PCP: No primary care provider on file. [phone]     Date of Admission: 10/12/2020                                                                                                                     HOSPITAL COURSE    Patient demographics:  The patient  Abbi Heard is a 80 y.o. male     Significant past medical history:   Patient Active Problem List   Diagnosis    AMS (altered mental status)    Syncope and collapse         Presenting symptoms:  Loss of Consciousness    Diagnostic workup:      CONSULTS DURING ADMISSION :   IP CONSULT TO NEUROLOGY      Patient was diagnosed with:  Syncope  Urinary tract infection  Generalized weakness      Treatment while inpatient:  80years old gentleman with history of a aphasia and dementia. Patient presented to the emergency room after having an episode of syncope at home. Wife reports that patient has a history of recurrent syncopes for which exact etiology is not known                 she reports that patient has never been seen with seizure disorder. Patient was diagnosed with urinary tract infection and treated with antibiotics. Patient has urinary incontinence. Patient will be discharged home on 5 days of ciprofloxacin. Patient's echocardiogram showed normal ejection fraction mild aortic stenosis and regurgitation. Patient will be referred to see cardiology as an outpatient.                                                                  ----------------------------------------------------------      SUBJECTIVE COMPLAINTS-  Follow up for Syncope     Diet: DIET GENERAL;      OBJECTIVE:   Patient Active Problem List   Diagnosis    AMS (altered mental status)    Syncope and collapse       Allergies  Patient has no known allergies.     Medications    Scheduled Meds:   cefTRIAXone (ROCEPHIN) IV  1 g Intravenous Daily    aspirin  81 mg Oral Daily    finasteride  5 mg Oral Daily    levothyroxine  175 mcg Oral Daily    cetirizine  5 mg Oral Daily    therapeutic multivitamin-minerals  1 tablet Oral Daily    pravastatin  40 mg Oral Nightly    Vitamin D  5,000 Units Oral Daily    sodium chloride flush  10 mL Intravenous 2 times per day    enoxaparin  40 mg Subcutaneous Nightly    famotidine  20 mg Oral Nightly     Continuous Infusions:    PRN Meds:  polyethylene glycol, sodium chloride flush, potassium chloride **OR** potassium alternative oral replacement **OR** potassium chloride, acetaminophen **OR** acetaminophen, polyethylene glycol, promethazine **OR** ondansetron    Vitals   Vitals /wt   Patient Vitals for the past 8 hrs:   BP Temp Temp src Pulse Resp SpO2 Weight   10/15/20 0534 (!) 148/79 97.5 °F (36.4 °C) Oral 66 18 97 % --   10/15/20 0531 -- -- -- -- -- -- 176 lb 9.4 oz (80.1 kg)        72HR INTAKE/OUTPUT:      Intake/Output Summary (Last 24 hours) at 10/15/2020 0938  Last data filed at 10/15/2020 0534  Gross per 24 hour   Intake 1320 ml   Output --   Net 1320 ml       Exam:    Gen:   Alert and oriented × patient is nonverbal  Eyes: PERRL. No sclera icterus. No conjunctival injection. ENT: No discharge. Pharynx clear. External appearance of ears and nose normal.  Neck: Trachea midline. No obvious mass. Resp: No accessory muscle use. No crackles. No wheezes. No rhonchi. CV: Regular rate. Regular rhythm. No murmur or rub. No edema. GI: Non-tender. Non-distended. No hernia. Skin: Warm, dry, normal texture and turgor. Lymph: No cervical LAD. No supraclavicular LAD. M/S: / Ext. No cyanosis. No clubbing. No joint deformity. Neuro: CN 2-12 are intact,  no neurologic deficits noted. PT/INR: No results for input(s): PROTIME, INR in the last 72 hours. APTT: No results for input(s): APTT in the last 72 hours.     CBC:   Recent Labs     10/12/20  0953 10/13/20  0709 10/14/20  0716   WBC 5.9 5.3 5.6   HGB 12.0* 12.9* 13.0*   HCT 36.1* 37.8* 39.1*   MCV 97.9 95.8 97.1    204 207       BMP: Recent Labs     10/12/20  0953 10/13/20  0709 10/14/20  0716    137 138   K 4.0 3.6 3.9    104 105   CO2 22 25 23   BUN 18 14 15   CREATININE 1.2 1.0 1.0       LIVER PROFILE: No results for input(s): ALKPHOS, AST, ALT, ALB, BILIDIR, BILITOT, ALKPHOS in the last 72 hours. No results for input(s): AMYLASE in the last 72 hours. No results for input(s): LIPASE in the last 72 hours. UA:  Recent Labs     10/12/20  1103   WBCUA >900*   RBCUA 13*       TROPONIN:   Recent Labs     10/12/20  0953   TROPONINI <0.01       Lab Results   Component Value Date/Time    URRFLXCULT Yes 10/12/2020 11:03 AM       No results for input(s): TSHREFLEX in the last 72 hours. No components found for: [id number]  POC GLUCOSE:    Recent Labs     10/12/20  0957   POCGLU 115*     No results for input(s): LABA1C in the last 72 hours. No results found for: LABA1C      ASSESSMENT AND PLAN  Syncope R55  Likely due to urinary tract infection  Blood pressure is stable  Discontinue  Iv  fluids  Monitor on telemetry  CT scan of the head is negative  Neurology consult is appreciated  Echocardiogram shows normal ejection fraction with mild aortic stenosis with mild aortic regurgitation      Suspicion for seizure disorder  Neurology recommends a trial of low-dose Keppra  Start patient on Keppra empirically  Patient can be continued if no further syncope  If he continues to have syncope Keppra can be discontinued        Urinary tract infection  Continue ceftriaxone pending culture sensitivity   if  No  Growth  On  cultures  Patient  Can  Be  Discharged  On  ciprofloxacin      Hypothyroidism  E03.9  Continue on Synthroid        Hyperlipidemia  E78.5  No current evidence of Rhabdomyolysis or other adverse effects. Continue statin therapy while in the hospital        DVT and GI prophylaxis    OT PT    Code Status: Full Code        Dispo -to extended care facility in a.m.       The patient and / or the family were informed of the results of any tests, a time was given to answer questions, a plan was proposed and they agreed with plan. Tashi Santo MD    This note was transcribed using [id number] [employer]. Please disregard any translational errors.

## 2020-10-15 NOTE — CONSULTS
Please refer to consult from 10/13. Discussed w/ RN. Will see in follow up today to address any additional questions.       Vania Martinez NP  [address] [place] [address] [address] Neurology

## 2020-10-16 ENCOUNTER — APPOINTMENT (OUTPATIENT)
Dept: GENERAL RADIOLOGY | Age: 85
DRG: 312 | End: 2020-10-16
Payer: MEDICARE

## 2020-10-16 VITALS
TEMPERATURE: 98.1 F | DIASTOLIC BLOOD PRESSURE: 53 MMHG | HEIGHT: 72 IN | RESPIRATION RATE: 18 BRPM | HEART RATE: 76 BPM | BODY MASS INDEX: 23.14 KG/M2 | OXYGEN SATURATION: 97 % | SYSTOLIC BLOOD PRESSURE: 111 MMHG | WEIGHT: 170.86 LBS

## 2020-10-16 PROCEDURE — 96366 THER/PROPH/DIAG IV INF ADDON: CPT

## 2020-10-16 PROCEDURE — G0378 HOSPITAL OBSERVATION PER HR: HCPCS

## 2020-10-16 PROCEDURE — 97530 THERAPEUTIC ACTIVITIES: CPT

## 2020-10-16 PROCEDURE — 2580000003 HC RX 258: Performed by: HOSPITALIST

## 2020-10-16 PROCEDURE — 73560 X-RAY EXAM OF KNEE 1 OR 2: CPT

## 2020-10-16 PROCEDURE — 6370000000 HC RX 637 (ALT 250 FOR IP): Performed by: HOSPITALIST

## 2020-10-16 PROCEDURE — 6360000002 HC RX W HCPCS: Performed by: HOSPITALIST

## 2020-10-16 RX ORDER — CIPROFLOXACIN 500 MG/1
500 TABLET, FILM COATED ORAL 2 TIMES DAILY
Qty: 10 TABLET | Refills: 0
Start: 2020-10-16 | End: 2020-10-21

## 2020-10-16 RX ORDER — LEVETIRACETAM 100 MG/ML
500 SOLUTION ORAL 2 TIMES DAILY
Qty: 300 ML | Refills: 0
Start: 2020-10-16

## 2020-10-16 RX ADMIN — SODIUM CHLORIDE, PRESERVATIVE FREE 10 ML: 5 INJECTION INTRAVENOUS at 08:40

## 2020-10-16 RX ADMIN — ASPIRIN 81 MG: 81 TABLET, COATED ORAL at 08:40

## 2020-10-16 RX ADMIN — CEFTRIAXONE 1 G: 1 INJECTION, POWDER, FOR SOLUTION INTRAMUSCULAR; INTRAVENOUS at 12:51

## 2020-10-16 RX ADMIN — CETIRIZINE HYDROCHLORIDE 5 MG: 10 TABLET, FILM COATED ORAL at 08:39

## 2020-10-16 RX ADMIN — LEVOTHYROXINE SODIUM 175 MCG: 0.1 TABLET ORAL at 07:51

## 2020-10-16 RX ADMIN — MULTIPLE VITAMINS W/ MINERALS TAB 1 TABLET: TAB at 08:40

## 2020-10-16 RX ADMIN — LEVETIRACETAM 500 MG: 500 SOLUTION ORAL at 00:01

## 2020-10-16 RX ADMIN — Medication 5000 UNITS: at 08:40

## 2020-10-16 RX ADMIN — LEVETIRACETAM 500 MG: 500 SOLUTION ORAL at 08:40

## 2020-10-16 RX ADMIN — FINASTERIDE 5 MG: 5 TABLET, FILM COATED ORAL at 08:39

## 2020-10-16 ASSESSMENT — PAIN SCALES - WONG BAKER
WONGBAKER_NUMERICALRESPONSE: 0

## 2020-10-16 NOTE — CARE COORDINATION
Discharge Planning:  ANABEL received a call from pts dgtr, Santana Paul. Samrosio Paul stated that she is inquiring as to what options the family has for care if insurance has denied a SNF stay for this pt. ANABEL discussed options of family caring for pt, increasing hours from the already built in hours of [facility] and long term care options. Santana Paul stated that this pt is not a wanderer and sleeps through the night, she stated that she would like to pursue an increase in hours through [address] [place] [address] to cover the times that this pt is up and awake throughout the daytime and then have this SW arrange for [facility]. in the home. Santana Paul noted that [facility] sends in two aides at a time to care for this pt and in the event that an aide calls off, Santana Paul or her spouse will go to the home to assist.   ANABEL contacted [facility] and spoke with Floretta Hammans. Floretta Hammans stated that Huma Villatoro uses [facility] for skilled home care. Plan: Santana Paul (pts dgtr) will contact his LITTLE after contacting [facility] with the request to increase in home care. Skilled Home Care will be arranged for pt once the [facility] care has been arranged in the home.    Electronically signed by Jordan Guzmán on 10/16/2020 at 12:21 PM

## 2020-10-16 NOTE — PROGRESS NOTES
shower. Pt has hx of Alzheimer's disease and is non-verbal.  Family / Caregiver Present: Yes(wife)  Referring Practitioner: Trey Avalos MD  Subjective  Subjective: Pt non-verbal but able to nod head yes to sitting EOB. General Comment  Comments: Pt supine in bed upon arrival.          Objective   Bed mobility  Supine to Sit: Maximum assistance;2 Person assistance  Sit to Supine: Maximum assistance;2 Person assistance  Transfers  Sit to Stand: 2 Person Assistance;Maximum Assistance(attempted sit-stand from EOB to walker with max A x2 - unable to clear bottom after 3 trials, pt's feet slipping forward)  Ambulation  Ambulation?: No     Balance  Comments: Pt sitting EOB x5 minutes with CGA, demonstrated L lateral lean in sitting when fatigued; pt became intermittently less alert while sitting EOB but improved once supine    AM-PAC Score  AM-PAC Inpatient Mobility Raw Score : 8 (10/16/20 1105)  AM-PAC Inpatient T-Scale Score : 28.52 (10/16/20 1105)  Mobility Inpatient CMS 0-100% Score: 86.62 (10/16/20 1105)  Mobility Inpatient CMS G-Code Modifier : CM (10/16/20 1105)        Goals  Short term goals  Time Frame for Short term goals: upon d/c -ongoing  Short term goal 1: bed mobility modA x2  Short term goal 2: transfer to bedside chair via [address] [place] [address] stedy with assist of 2  Short term goal 3: assess ambulation when appropriate  Patient Goals   Patient goals : pt unable to state goals due to cognition    Plan    Plan  Times per week: 3-5x/wk  Current Treatment Recommendations: Functional Mobility Training  Safety Devices  Type of devices:  All fall risk precautions in place, Bed alarm in place, Call light within reach, Left in bed, Nurse notified  Restraints  Initially in place: No     Therapy Time   Individual Concurrent Group Co-treatment   Time In 1036         Time Out 1100         Minutes 24         Timed Code Treatment Minutes: [address] [place], [id number]  [facility]

## 2020-10-16 NOTE — PROGRESS NOTES
Called report to idania [facility]. Denied questions or concerns. Removed IV and placed clean,ry, dressing to site. Patient picked up by [facility] at [phone].    Electronically signed by Mary Roberts RN on 10/16/2020 at 7:00 PM

## 2020-10-16 NOTE — PROGRESS NOTES
Occupational Therapy  Facility/Department: [address] MED SURG  Daily Treatment Note  NAME: Rivas Romo  : 10/6/1929  MRN: [id number]    Date of Service: 10/16/2020    Discharge Recommendations:  ECF with OT, Patient would benefit from continued therapy after discharge  OT Equipment Recommendations  Other: defer to next LOC    Assessment   Performance deficits / Impairments: Decreased functional mobility ; Decreased ADL status; Decreased ROM; Decreased cognition;Decreased safe awareness;Decreased strength;Decreased endurance;Decreased balance  Assessment: Pt not resistant to therapy and does participate in automatic tasks, but does not follow commands. Pt requiring max A x2 for bed mobility, CGA for sitting balance, and attempted sit><stand to walker x3 trials without success despite max A x2. Anticipate pt would require total A for ADLs. Wife believes pt is having more difficulty than baseline d/t R knee pain and would like for pt to have an x-ray. Pt would benefit from trial of OT services at [facility] to maximize pt's safety, function, and to decrease caregiver burden. Prognosis: Guarded;Poor  OT Education: OT Role;Orientation  Barriers to Learning: cognition  REQUIRES OT FOLLOW UP: Yes  Activity Tolerance  Activity Tolerance: Treatment limited secondary to decreased cognition  Safety Devices  Safety Devices in place: Yes  Type of devices: Call light within reach; Bed alarm in place; Left in bed         Patient Diagnosis(es): The primary encounter diagnosis was Syncope and collapse. A diagnosis of Urinary tract infection without hematuria, site unspecified was also pertinent to this visit. has a past medical history of Alzheimer disease ([address] Heart murmur, Hyperlipidemia, and Thyroid disease. has no past surgical history on file.     Restrictions  Restrictions/Precautions  Restrictions/Precautions: Fall Risk  Position Activity Restriction  Other position/activity restrictions: IV, non-verbal  Subjective Recorded as Task  Date: 03/21/2017 02:03 PM, Created By: Gladys Diaz  Task Name: 4. Patient Message  Assigned To: PRAMOD MEDRANO  Regarding Patient: MUMTAZ LARES, Status: In Progress  Comment:   Gladys Diaz - 21 Mar 2017 2:03 PM    Patient Message to Provider  \"REASON FOR CALL: pt mom was calling to say that the pt was seen by Dr contreras today ,by the pt being put on predisnone should he advance to two puffs in the morning and at night with the Qvar or stick with the one puff in the morning and one at night with Qvar    CALLER'S RELATIONSHIP TO PATIENT: Mom- Caryl  IF OTHER, NAME AND RELATIONSHIP: ___    BEST NUMBER TO BE CONTACTED: [phone]  ALTERNATIVE PHONE NUMBER: ___    Turnaround time given to caller:  \"\"THIS MESSAGE WILL BE SENT TO YOUR PROVIDER, THE CLINICAL TEAM WILL RETURN YOUR CALL AS SOON AS THEY HAVE REVIEWED YOUR MESSAGE\"\"    READ BACK MESSAGE TO PATIENT\"  Jael Lucia - 21 Mar 2017 3:15 PM    TASK IN PROGRESS  Jael Lucia - 21 Mar 2017 3:18 PM    TASK EDITED  Spoke with mom. Patient to increase QVAR dose to 2p BID for remainder of steroid course. Mom verbalizes understanding and has no further needs.      Electronically signed by:Jael Lucia R.N.  Mar 21 2017  3:18PM CST Co-author    General  Chart Reviewed: Yes  Patient assessed for rehabilitation services?: Yes  Additional Pertinent Hx: Per H&P: \"The patient Glen Bumpers is a 80 y.o.male with medical history significant for Alzheimer's disease heart murmur hyperlipidemia and hypothyroidism. Patient presented to the emergency room after having an episode of syncope at home. Patient is nonverbal and is unable to provide any history. Reportedly patient normally ambulates with a walker. He is a resident of extended care facility. He was being assisted out of the shower when he passed out. Patient did not have a seizure disorder\"  Family / Caregiver Present: Yes(wife)  Referring Practitioner: Raghavendra Murry  Diagnosis: syncope  Subjective  Subjective: Pt met b/s for OT cotx with PT. Pt in bed on arrival, eyes closed, opens eyes to call of name. Pt not resistant to participating in therapy. Wife present at bedside and reports pt's R knee is very sore and she wants pt to have an x-ray.  Pt non-verbal.      Orientation  Orientation  Overall Orientation Status: Impaired(pt non-verbal)  Objective    ADLs: not addressed this sessoin, but anticipate pt would require total A at this time  Balance  Sitting Balance: Contact guard assistance(seated EOB CGA, while seated EOB pt with intermittent episodes of unresponsiveness where pt would close eyes)  Standing Balance: (pt unable to clear buttocks from EOB when attempting to stand at walker despite max A x2)    Bed mobility  Supine to Sit: Maximum assistance;2 Person assistance  Sit to Supine: Maximum assistance;2 Person assistance  Scooting: Dependent/Total;2 Person assistance(to scoot up in bed)     Transfers  Sit to stand: (attempted x3 trials EOB>RW with max A x2 without success)     Cognition  Overall Cognitive Status: Exceptions  Arousal/Alertness: Inconsistent responses to stimuli  Following Commands: Does not follow commands  Initiation: Requires cues for all  Sequencing: Requires cues for all  Cognition Comment: pt not following commands, advanced dementia and non-verbal at baseline        Plan   Plan  Times per week: 3-5  Times per day: Daily  Current Treatment Recommendations: Strengthening, Functional Mobility Training, Endurance Training, Balance Training, Safety Education & Training, Self-Care / ADL, ROM, Patient/Caregiver Education & Training    AM-PAC Score        AM-PAC Inpatient Daily Activity Raw Score: 6 (10/16/20 1101)  AM-PAC Inpatient ADL T-Scale Score : 17.07 (10/16/20 1101)  ADL Inpatient CMS 0-100% Score: 100 (10/16/20 1101)  ADL Inpatient CMS G-Code Modifier : CN (10/16/20 1101)    Goals  Short term goals  Time Frame for Short term goals: prior to d/c: ALL GOALS ONGOING 10/16  Short term goal 1: Pt will complete bed mobility with mod Ax2  Short term goal 2: Pt will complete transfer to bedside chair using jonh stedy lift with mod Ax2  Short term goal 3: Pt will groom with min A  Short term goal 4: Pt will complete x10 reps B UE therex in order to increase strength/endurance for ADLs  Long term goals  Time Frame for Long term goals : LTG=STG  Patient Goals   Patient goals : pt is non-verbal so unable to indicate a goal       Therapy Time   Individual Concurrent Group Co-treatment   Time In 1036         Time Out 1100         Minutes 27 Carmen Martin, OTR/L 7234

## 2020-10-16 NOTE — CARE COORDINATION
Discharge Planning:  SW met with pts spouse who has talked with Jimi Washington at [facility]. The family has elected to have pt go to [facility] long term care with skilled care provided under pts Medicare part Kerrie Friedman has talked with pts spouse and has worked this out. Evelyn Moss Spouse is agreeable to pt discharging this evening to [facility]. DISCHARGE SUMMARY     DATE OF DISCHARGE: 10/16/2020    DISCHARGE DESTINATION: [address] [place] [address] [address]    Level of Care: Intermediate    Report Number: [phone]  Fax Number:  [phone]    PASRR completed    TRANSPORTATION: [facility] Name:  [facility]    [facility] 28 up Time: 0371    Phone Number: [phone]    COMMENTS: SW notified Jimi Washington at [facility], pts bedside RN, and pts spouse of the d/c time and plan. All in agreement.    Electronically signed by Ambrosio Benson on 10/16/2020 at 4:02 PM

## 2020-10-17 NOTE — DISCHARGE SUMMARY
Hospital Medicine Discharge Summary      Patient ID: Leoncio Banda , [id number]     Patient's PCP: No primary care provider on file. Admit Date: 10/12/2020     Discharge Date: 10/16/2020      Admitting Physician: Rosita Nichole MD    Discharge Physician: Francisca De Leon MD     Discharge Diagnoses: Active Hospital Problems    Diagnosis Date Noted    Syncope and collapse [R55] 10/12/2020         The patient was seen and examined on the day of discharge and this discharge summary is in conjunction with any daily progress note from day of discharge. HOSPITAL COURSE       Patient demographics:  The patient  Leoncio Banda is a 80 y.o. male      Significant past medical history:       Patient Active Problem List   Diagnosis    AMS (altered mental status)    Syncope and collapse            Presenting symptoms:  Loss of Consciousness     Diagnostic workup:   XR KNEE RIGHT (1-2 VIEWS)   CT Head WO Contrast        CONSULTS DURING ADMISSION :   IP CONSULT TO NEUROLOGY  IP CONSULT TO NEUROLOGY        Patient was diagnosed with:  Recurrent syncope  Urinary tract infection  Generalized weakness        Treatment while inpatient:  80years old gentleman with history of a aphasia and dementia. Patient presented to the emergency room after having an episode of syncope at home. Wife reports that patient has a history of recurrent syncopes for which exact etiology is not known                 she reports that patient has never been seen with seizure disorder. Neurology evaluated the patient and recommended we can use low-dose Keppra empirically to see if it helps with the recurrent syncope  Patient was diagnosed with urinary tract infection and treated with antibiotics. Patient has urinary incontinence. Patient will be discharged home on 5 days of ciprofloxacin. Patient's echocardiogram showed normal ejection fraction mild aortic stenosis and regurgitation.   Patient will be referred to see cardiology as an outpatient.        Discharge Condition:  stable:     Discharged to:  skilled nursing  facility     Activity:   as tolerated:     Follow Up: Follow-up with the nursing home [facility]  Sheyla Clifford: For convenience and continuity at follow-up the following most recent labs are provided:      CBC:   Lab Results   Component Value Date    WBC 5.6 10/14/2020    HGB 13.0 10/14/2020    HCT 39.1 10/14/2020     10/14/2020       RENAL:   Lab Results   Component Value Date     10/14/2020    K 3.9 10/14/2020    K 3.6 10/13/2020     10/14/2020    CO2 23 10/14/2020    BUN 15 10/14/2020    CREATININE 1.0 10/14/2020           Discharge Medications:    Tianna Harvey Medication Instructions [id number]:[id number]    Printed on:10/16/20 1977   Medication Information                      aspirin 81 MG EC tablet  Take 81 mg by mouth daily             ciprofloxacin (CIPRO) 500 MG tablet  Take 1 tablet by mouth 2 times daily for 5 days             finasteride (PROSCAR) 5 MG tablet  Take 5 mg by mouth daily             levETIRAcetam (KEPPRA) 100 MG/ML solution  Take 5 mLs by mouth 2 times daily             levothyroxine (SYNTHROID) 175 MCG tablet  Take 175 mcg by mouth Daily             loratadine (CLARITIN) 10 MG tablet  Take 10 mg by mouth daily as needed (allergies)             Multiple Vitamins-Minerals (THERAPEUTIC MULTIVITAMIN-MINERALS) tablet  Take 1 tablet by mouth daily             polyethylene glycol (MIRALAX) PACK packet  Take 17 g by mouth 2 times daily as needed (constipation)              pravastatin (PRAVACHOL) 40 MG tablet  Take 40 mg by mouth nightly             vitamin D (CHOLECALCIFEROL) 25 MCG (1000 UT) TABS tablet  Take 5,000 Units by mouth daily                    Time Spent on discharge is more than 30 min in the examination, evaluation, counseling and review of medications and discharge plan. Signed:  Gagandeep Mott MD   10/16/2020      Thank you No primary care provider on file.  for the opportunity to be involved in this patient's care. If you have any questions or concerns please feel free to contact me at [phone]. This note was transcribed using rocket staff. Please disregard any translational errors.

## 2020-10-31 ENCOUNTER — HOSPITAL ENCOUNTER (INPATIENT)
Age: 85
LOS: 2 days | Discharge: HOSPICE | DRG: 871 | End: 2020-11-02
Attending: FAMILY MEDICINE | Admitting: INTERNAL MEDICINE
Payer: MEDICARE

## 2020-10-31 ENCOUNTER — APPOINTMENT (OUTPATIENT)
Dept: GENERAL RADIOLOGY | Age: 85
DRG: 871 | End: 2020-10-31
Payer: MEDICARE

## 2020-10-31 DIAGNOSIS — A41.9 SEVERE SEPSIS (HCC): ICD-10-CM

## 2020-10-31 DIAGNOSIS — N17.9 ACUTE RENAL FAILURE, UNSPECIFIED ACUTE RENAL FAILURE TYPE (HCC): ICD-10-CM

## 2020-10-31 DIAGNOSIS — E87.6 HYPOKALEMIA: ICD-10-CM

## 2020-10-31 DIAGNOSIS — R65.20 SEVERE SEPSIS (HCC): ICD-10-CM

## 2020-10-31 DIAGNOSIS — J18.9 HCAP (HEALTHCARE-ASSOCIATED PNEUMONIA): ICD-10-CM

## 2020-10-31 DIAGNOSIS — E87.0 HYPERNATREMIA: ICD-10-CM

## 2020-10-31 DIAGNOSIS — J96.01 ACUTE RESPIRATORY FAILURE WITH HYPOXEMIA (HCC): Primary | ICD-10-CM

## 2020-10-31 PROBLEM — Z20.822 SUSPECTED COVID-19 VIRUS INFECTION: Status: ACTIVE | Noted: 2020-10-31

## 2020-10-31 LAB
A/G RATIO: 0.6 (ref 1.1–2.2)
ALBUMIN SERPL-MCNC: 2.7 G/DL (ref 3.4–5)
ALP BLD-CCNC: 135 U/L (ref 40–129)
ALT SERPL-CCNC: 75 U/L (ref 10–40)
ANION GAP SERPL CALCULATED.3IONS-SCNC: 21 MMOL/L (ref 3–16)
AST SERPL-CCNC: 45 U/L (ref 15–37)
BASE EXCESS ARTERIAL: -2.3 MMOL/L (ref -3–3)
BASOPHILS ABSOLUTE: 0.1 K/UL (ref 0–0.2)
BASOPHILS RELATIVE PERCENT: 0.5 %
BILIRUB SERPL-MCNC: 0.7 MG/DL (ref 0–1)
BILIRUBIN URINE: NEGATIVE
BLOOD, URINE: NEGATIVE
BUN BLDV-MCNC: 69 MG/DL (ref 7–20)
C-REACTIVE PROTEIN: 340.3 MG/L (ref 0–5.1)
CALCIUM SERPL-MCNC: 9.4 MG/DL (ref 8.3–10.6)
CARBOXYHEMOGLOBIN ARTERIAL: 1.3 % (ref 0–1.5)
CHLORIDE BLD-SCNC: 127 MMOL/L (ref 99–110)
CLARITY: ABNORMAL
CO2: 18 MMOL/L (ref 21–32)
COLOR: YELLOW
CREAT SERPL-MCNC: 2.7 MG/DL (ref 0.8–1.3)
D DIMER: >5250 NG/ML DDU (ref 0–229)
EOSINOPHILS ABSOLUTE: 0 K/UL (ref 0–0.6)
EOSINOPHILS RELATIVE PERCENT: 0 %
EPITHELIAL CELLS, UA: 1 /HPF (ref 0–5)
FERRITIN: 2959 NG/ML (ref 30–400)
GFR AFRICAN AMERICAN: 27
GFR NON-AFRICAN AMERICAN: 22
GLOBULIN: 4.2 G/DL
GLUCOSE BLD-MCNC: 110 MG/DL (ref 70–99)
GLUCOSE URINE: NEGATIVE MG/DL
HCO3 ARTERIAL: 20.5 MMOL/L (ref 21–29)
HCT VFR BLD CALC: 45.9 % (ref 40.5–52.5)
HEMOGLOBIN, ART, EXTENDED: 13.1 G/DL (ref 13.5–17.5)
HEMOGLOBIN: 14.5 G/DL (ref 13.5–17.5)
HYALINE CASTS: 1 /LPF (ref 0–8)
KETONES, URINE: NEGATIVE MG/DL
LACTATE DEHYDROGENASE: 420 U/L (ref 100–190)
LACTIC ACID, SEPSIS: 3.8 MMOL/L (ref 0.4–1.9)
LEUKOCYTE ESTERASE, URINE: NEGATIVE
LYMPHOCYTES ABSOLUTE: 0.7 K/UL (ref 1–5.1)
LYMPHOCYTES RELATIVE PERCENT: 4.2 %
MAGNESIUM: 3.2 MG/DL (ref 1.8–2.4)
MCH RBC QN AUTO: 32.6 PG (ref 26–34)
MCHC RBC AUTO-ENTMCNC: 31.5 G/DL (ref 31–36)
MCV RBC AUTO: 103.3 FL (ref 80–100)
METHEMOGLOBIN ARTERIAL: 0 %
MICROSCOPIC EXAMINATION: YES
MONOCYTES ABSOLUTE: 0.6 K/UL (ref 0–1.3)
MONOCYTES RELATIVE PERCENT: 3.8 %
NEUTROPHILS ABSOLUTE: 14.7 K/UL (ref 1.7–7.7)
NEUTROPHILS RELATIVE PERCENT: 91.5 %
NITRITE, URINE: NEGATIVE
O2 CONTENT ARTERIAL: 17 ML/DL
O2 SAT, ARTERIAL: 92 %
O2 THERAPY: ABNORMAL
PCO2 ARTERIAL: 29 MMHG (ref 35–45)
PDW BLD-RTO: 15 % (ref 12.4–15.4)
PH ARTERIAL: 7.46 (ref 7.35–7.45)
PH UA: 5.5 (ref 5–8)
PLATELET # BLD: 247 K/UL (ref 135–450)
PLATELET SLIDE REVIEW: ADEQUATE
PMV BLD AUTO: 9.5 FL (ref 5–10.5)
PO2 ARTERIAL: 58.7 MMHG (ref 75–108)
POTASSIUM REFLEX MAGNESIUM: 3 MMOL/L (ref 3.5–5.1)
PRO-BNP: 2216 PG/ML (ref 0–449)
PROCALCITONIN: 3.54 NG/ML (ref 0–0.15)
PROTEIN UA: ABNORMAL MG/DL
RBC # BLD: 4.44 M/UL (ref 4.2–5.9)
RBC UA: 2 /HPF (ref 0–4)
SARS-COV-2, NAAT: NOT DETECTED
SEDIMENTATION RATE, ERYTHROCYTE: 39 MM/HR (ref 0–20)
SLIDE REVIEW: ABNORMAL
SODIUM BLD-SCNC: 166 MMOL/L (ref 136–145)
SPECIFIC GRAVITY UA: 1.02 (ref 1–1.03)
TCO2 ARTERIAL: 21.4 MMOL/L
TOTAL PROTEIN: 6.9 G/DL (ref 6.4–8.2)
TROPONIN: 0.1 NG/ML
URINE REFLEX TO CULTURE: ABNORMAL
URINE TYPE: ABNORMAL
UROBILINOGEN, URINE: 0.2 E.U./DL
WBC # BLD: 16.1 K/UL (ref 4–11)
WBC UA: 0 /HPF (ref 0–5)

## 2020-10-31 PROCEDURE — 83615 LACTATE (LD) (LDH) ENZYME: CPT

## 2020-10-31 PROCEDURE — 83605 ASSAY OF LACTIC ACID: CPT

## 2020-10-31 PROCEDURE — 2580000003 HC RX 258: Performed by: EMERGENCY MEDICINE

## 2020-10-31 PROCEDURE — 86140 C-REACTIVE PROTEIN: CPT

## 2020-10-31 PROCEDURE — 73630 X-RAY EXAM OF FOOT: CPT

## 2020-10-31 PROCEDURE — 1200000000 HC SEMI PRIVATE

## 2020-10-31 PROCEDURE — 96367 TX/PROPH/DG ADDL SEQ IV INF: CPT

## 2020-10-31 PROCEDURE — 2700000000 HC OXYGEN THERAPY PER DAY

## 2020-10-31 PROCEDURE — 83880 ASSAY OF NATRIURETIC PEPTIDE: CPT

## 2020-10-31 PROCEDURE — 80053 COMPREHEN METABOLIC PANEL: CPT

## 2020-10-31 PROCEDURE — 94761 N-INVAS EAR/PLS OXIMETRY MLT: CPT

## 2020-10-31 PROCEDURE — 85652 RBC SED RATE AUTOMATED: CPT

## 2020-10-31 PROCEDURE — 6370000000 HC RX 637 (ALT 250 FOR IP): Performed by: EMERGENCY MEDICINE

## 2020-10-31 PROCEDURE — 96365 THER/PROPH/DIAG IV INF INIT: CPT

## 2020-10-31 PROCEDURE — 83735 ASSAY OF MAGNESIUM: CPT

## 2020-10-31 PROCEDURE — 87040 BLOOD CULTURE FOR BACTERIA: CPT

## 2020-10-31 PROCEDURE — U0002 COVID-19 LAB TEST NON-CDC: HCPCS

## 2020-10-31 PROCEDURE — 85025 COMPLETE CBC W/AUTO DIFF WBC: CPT

## 2020-10-31 PROCEDURE — 99285 EMERGENCY DEPT VISIT HI MDM: CPT

## 2020-10-31 PROCEDURE — U0003 INFECTIOUS AGENT DETECTION BY NUCLEIC ACID (DNA OR RNA); SEVERE ACUTE RESPIRATORY SYNDROME CORONAVIRUS 2 (SARS-COV-2) (CORONAVIRUS DISEASE [COVID-19]), AMPLIFIED PROBE TECHNIQUE, MAKING USE OF HIGH THROUGHPUT TECHNOLOGIES AS DESCRIBED BY CMS-2020-01-R: HCPCS

## 2020-10-31 PROCEDURE — 6360000002 HC RX W HCPCS: Performed by: INTERNAL MEDICINE

## 2020-10-31 PROCEDURE — 71045 X-RAY EXAM CHEST 1 VIEW: CPT

## 2020-10-31 PROCEDURE — 6360000002 HC RX W HCPCS: Performed by: EMERGENCY MEDICINE

## 2020-10-31 PROCEDURE — 85379 FIBRIN DEGRADATION QUANT: CPT

## 2020-10-31 PROCEDURE — 82803 BLOOD GASES ANY COMBINATION: CPT

## 2020-10-31 PROCEDURE — 94640 AIRWAY INHALATION TREATMENT: CPT

## 2020-10-31 PROCEDURE — 82728 ASSAY OF FERRITIN: CPT

## 2020-10-31 PROCEDURE — 36415 COLL VENOUS BLD VENIPUNCTURE: CPT

## 2020-10-31 PROCEDURE — 84145 PROCALCITONIN (PCT): CPT

## 2020-10-31 PROCEDURE — 84484 ASSAY OF TROPONIN QUANT: CPT

## 2020-10-31 PROCEDURE — 93005 ELECTROCARDIOGRAM TRACING: CPT | Performed by: EMERGENCY MEDICINE

## 2020-10-31 PROCEDURE — 81001 URINALYSIS AUTO W/SCOPE: CPT

## 2020-10-31 RX ORDER — 0.9 % SODIUM CHLORIDE 0.9 %
1000 INTRAVENOUS SOLUTION INTRAVENOUS ONCE
Status: COMPLETED | OUTPATIENT
Start: 2020-10-31 | End: 2020-10-31

## 2020-10-31 RX ORDER — LORAZEPAM 2 MG/ML
1 INJECTION INTRAMUSCULAR
Status: DISCONTINUED | OUTPATIENT
Start: 2020-10-31 | End: 2020-11-01

## 2020-10-31 RX ORDER — POTASSIUM CHLORIDE 7.45 MG/ML
10 INJECTION INTRAVENOUS
Status: DISCONTINUED | OUTPATIENT
Start: 2020-10-31 | End: 2020-10-31

## 2020-10-31 RX ORDER — ACETAMINOPHEN 650 MG/1
650 SUPPOSITORY RECTAL ONCE
Status: COMPLETED | OUTPATIENT
Start: 2020-10-31 | End: 2020-10-31

## 2020-10-31 RX ORDER — MORPHINE SULFATE 4 MG/ML
4 INJECTION, SOLUTION INTRAMUSCULAR; INTRAVENOUS
Status: DISCONTINUED | OUTPATIENT
Start: 2020-10-31 | End: 2020-11-01

## 2020-10-31 RX ORDER — IPRATROPIUM BROMIDE AND ALBUTEROL SULFATE 2.5; .5 MG/3ML; MG/3ML
1 SOLUTION RESPIRATORY (INHALATION) ONCE
Status: COMPLETED | OUTPATIENT
Start: 2020-10-31 | End: 2020-10-31

## 2020-10-31 RX ORDER — 0.9 % SODIUM CHLORIDE 0.9 %
500 INTRAVENOUS SOLUTION INTRAVENOUS ONCE
Status: COMPLETED | OUTPATIENT
Start: 2020-10-31 | End: 2020-10-31

## 2020-10-31 RX ORDER — MORPHINE SULFATE 2 MG/ML
2 INJECTION, SOLUTION INTRAMUSCULAR; INTRAVENOUS
Status: DISCONTINUED | OUTPATIENT
Start: 2020-10-31 | End: 2020-11-01

## 2020-10-31 RX ORDER — SODIUM CHLORIDE 9 MG/ML
INJECTION, SOLUTION INTRAVENOUS CONTINUOUS
Status: DISCONTINUED | OUTPATIENT
Start: 2020-10-31 | End: 2020-10-31

## 2020-10-31 RX ADMIN — SODIUM CHLORIDE: 9 INJECTION, SOLUTION INTRAVENOUS at 20:57

## 2020-10-31 RX ADMIN — IPRATROPIUM BROMIDE AND ALBUTEROL SULFATE 1 AMPULE: .5; 3 SOLUTION RESPIRATORY (INHALATION) at 18:20

## 2020-10-31 RX ADMIN — CEFEPIME HYDROCHLORIDE 2 G: 2 INJECTION, POWDER, FOR SOLUTION INTRAVENOUS at 17:50

## 2020-10-31 RX ADMIN — SODIUM CHLORIDE 500 ML: 9 INJECTION, SOLUTION INTRAVENOUS at 17:49

## 2020-10-31 RX ADMIN — ACETAMINOPHEN 650 MG: 650 SUPPOSITORY RECTAL at 18:03

## 2020-10-31 RX ADMIN — VANCOMYCIN HYDROCHLORIDE 1000 MG: 1 INJECTION, POWDER, LYOPHILIZED, FOR SOLUTION INTRAVENOUS at 18:21

## 2020-10-31 RX ADMIN — POTASSIUM CHLORIDE 10 MEQ: 7.46 INJECTION, SOLUTION INTRAVENOUS at 20:57

## 2020-10-31 RX ADMIN — SODIUM CHLORIDE 1000 ML: 9 INJECTION, SOLUTION INTRAVENOUS at 18:43

## 2020-10-31 RX ADMIN — MORPHINE SULFATE 2 MG: 2 INJECTION, SOLUTION INTRAMUSCULAR; INTRAVENOUS at 21:50

## 2020-10-31 ASSESSMENT — PAIN SCALES - GENERAL: PAINLEVEL_OUTOF10: 0

## 2020-10-31 NOTE — ED PROVIDER NOTES
EMERGENCY DEPARTMENT PROVIDER NOTE    Patient Identification  Pt Name: Mack Allan  MRN: [id number]  [place] 10/6/1929  Date of evaluation: 10/31/2020  Provider: Mona Cornell DO  PCP: No primary care provider on file. Chief Complaint    Shortness of breath      HPI  (History provided by NH report, family)  This is a 80 y.o. male with pertinent past medical history of Alzheimer's dementia who was brought in by EMS transportation from nursing home for shortness of breath. Patient arrives ill-appearing and is nonverbal, unable to contribute meaningfully to history and majority of the history is obtained from nursing home report. Apparently patient was noted to be very short of breath about 2 hours prior to arrival, EMS was called and he had low oxygen levels at the nursing home. Associated with fever. Nothing seems to bring on his symptoms or make them worse, nothing has seemed to make him better. On arrival here patient is toxic appearing and in respiratory distress oxygen saturations greater than 95% on 10 L by nonrebreather. I did call patient's daughter who is listed as his POA and representative at approximately [phone], patient is listed with nursing home as DNR, I confirmed with daughter that they do not wish to have intubation or chest compressions performed under any circumstances. .     ROS    Unable to obtain due to critical illness and altered mental status    I have reviewed the following nursing documentation:  Allergies: Patient has no known allergies. Past medical history:   Past Medical History:   Diagnosis Date    Alzheimer disease ([facility] [address]     Heart murmur     Hyperlipidemia     Thyroid disease      Past surgical history: History reviewed. No pertinent surgical history.     Home medications:   Previous Medications    ASPIRIN 81 MG EC TABLET    Take 81 mg by mouth daily    FINASTERIDE (PROSCAR) 5 MG TABLET    Take 5 mg by mouth daily    LEVETIRACETAM (KEPPRA) 100 MG/ML SOLUTION Take 5 mLs by mouth 2 times daily    LEVOTHYROXINE (SYNTHROID) 175 MCG TABLET    Take 175 mcg by mouth Daily    LORATADINE (CLARITIN) 10 MG TABLET    Take 10 mg by mouth daily as needed (allergies)    MULTIPLE VITAMINS-MINERALS (THERAPEUTIC MULTIVITAMIN-MINERALS) TABLET    Take 1 tablet by mouth daily    POLYETHYLENE GLYCOL (MIRALAX) PACK PACKET    Take 17 g by mouth 2 times daily as needed (constipation)     PRAVASTATIN (PRAVACHOL) 40 MG TABLET    Take 40 mg by mouth nightly    VITAMIN D (CHOLECALCIFEROL) 25 MCG (1000 UT) TABS TABLET    Take 5,000 Units by mouth daily       Social history:  reports that he has never smoked. He has never used smokeless tobacco. He reports that he does not drink alcohol. Family history:  History reviewed. No pertinent family history. Exam  ED Triage Vitals   BP Temp Temp Source Pulse Resp SpO2 Height Weight   10/31/20 1654 10/31/20 1708 10/31/20 1708 10/31/20 1654 10/31/20 1654 10/31/20 1654 -- --   (!) 118/56 103.5 °F (39.7 °C) Rectal 105 (!) 43 98 %       Nursing note and vitals reviewed. Constitutional: Well developed, well nourished. Ill appearing  HENT:      Head: Normocephalic and atraumatic. Ears: External ears normal.      Nose: Nose normal.     Mouth: Membrane mucosa dry and pink. Eyes: Anicteric sclera. No discharge. PERRL  Neck: Supple. Trachea midline. Cardiovascular: tachycardia, reg rhythm; no murmurs, rubs, or gallops. Pulmonary/Chest: Moderate respiratory distress, tachypnea, no retractions. Scattered rhonchi. . No stridor. No wheezes. No rales. Abdominal: Soft. No distension. Musculoskeletal: No gross deformity. No joint edema or erythema  Neurological: Somnolent, opens eyes briefly to touch, does not verbalize or follow commands. Face symmetric. Downward Babinskis b/l  Skin: Warm and dry.  Ulceration of posterior right heel with mild surrounding erythema, no foul odor or discharge      Procedures      EKG    EKG was reviewed by emergency department physician in the absence of a cardiologist    Narrow complex sinus rhythm, rate 101, normal axis, normal NJ and QRS intervals, normal Qtc, no ST elevations or depressions, TWI I and aVL, impression sinus tachycardia with frequent PAC's and nonspecific t wave morphology, no STEMI      Radiology  XR FOOT RIGHT (MIN 3 VIEWS)   Final Result   No acute osseous abnormality of the right foot or plain film evidence of   osteomyelitis. XR CHEST PORTABLE   Final Result   Mild increase in the right perihilar markings, possibly infiltrate.    Otherwise unremarkable chest.             Labs  Results for orders placed or performed during the hospital encounter of 10/31/20   CBC Auto Differential   Result Value Ref Range    WBC 16.1 (H) 4.0 - 11.0 K/uL    RBC 4.44 4.20 - 5.90 M/uL    Hemoglobin 14.5 13.5 - 17.5 g/dL    Hematocrit 45.9 40.5 - 52.5 %    .3 (H) 80.0 - 100.0 fL    MCH 32.6 26.0 - 34.0 pg    MCHC 31.5 31.0 - 36.0 g/dL    RDW 15.0 12.4 - 15.4 %    Platelets 077 828 - 430 K/uL    MPV 9.5 5.0 - 10.5 fL    PLATELET SLIDE REVIEW Adequate     SLIDE REVIEW see below     Neutrophils % 91.5 %    Lymphocytes % 4.2 %    Monocytes % 3.8 %    Eosinophils % 0.0 %    Basophils % 0.5 %    Neutrophils Absolute 14.7 (H) 1.7 - 7.7 K/uL    Lymphocytes Absolute 0.7 (L) 1.0 - 5.1 K/uL    Monocytes Absolute 0.6 0.0 - 1.3 K/uL    Eosinophils Absolute 0.0 0.0 - 0.6 K/uL    Basophils Absolute 0.1 0.0 - 0.2 K/uL   Comprehensive Metabolic Panel w/ Reflex to MG   Result Value Ref Range    Sodium 166 (HH) 136 - 145 mmol/L    Potassium reflex Magnesium 3.0 (L) 3.5 - 5.1 mmol/L    Chloride 127 (H) 99 - 110 mmol/L    CO2 18 (L) 21 - 32 mmol/L    Anion Gap 21 (H) 3 - 16    Glucose 110 (H) 70 - 99 mg/dL    BUN 69 (H) 7 - 20 mg/dL    CREATININE 2.7 (H) 0.8 - 1.3 mg/dL    GFR Non-African American 22 (A) >60    GFR  27 (A) >60    Calcium 9.4 8.3 - 10.6 mg/dL    Total Protein 6.9 6.4 - 8.2 g/dL    Alb 2.7 (L) 3.4 - 420 (H) 100 - 190 U/L   Ferritin   Result Value Ref Range    Ferritin 2,959.0 (H) 30.0 - 400.0 ng/mL   D-Dimer, Quantitative   Result Value Ref Range    D-Dimer, Quant >5250 (H) 0 - 229 ng/mL DDU   Sedimentation Rate   Result Value Ref Range    Sed Rate 39 (H) 0 - 20 mm/Hr   Microscopic Urinalysis   Result Value Ref Range    Hyaline Casts, UA 1 0 - 8 /LPF    WBC, UA 0 0 - 5 /HPF    RBC, UA 2 0 - 4 /HPF    Epithelial Cells, UA 1 0 - 5 /HPF   Magnesium   Result Value Ref Range    Magnesium 3.20 (H) 1.80 - 2.40 mg/dL   EKG 12 Lead   Result Value Ref Range    Ventricular Rate 101 BPM    Atrial Rate 101 BPM    P-R Interval 144 ms    QRS Duration 100 ms    Q-T Interval 372 ms    QTc Calculation (Bazett) 482 ms    P Axis 10 degrees    R Axis -20 degrees    T Axis 114 degrees    Diagnosis       Sinus tachycardia with Premature atrial complexesIncomplete right bundle branch blockLeft ventricular hypertrophy with repolarization abnormalityAbnormal ECGWhen compared with ECG of 12-OCT-2020 09:37,Premature atrial complexes are now PresentPR interval has decreasedVent. rate has increased BY  45 BPMIncomplete right bundle branch block is now Present       Screenings           MDM and ED Course    Patient arrived febrile and ill appearing,. In moderate respiratory distress which was modestly improved with supplemental oxygen by NRB. EKG no STEMI, troponin is minimally elevated however feel this is much more likely related to sepsis, ACS less likely. Leukocytosis and elevated procalcitonin noted, perihilar infiltrate on CXR likely source of sepsis. Abdomen Is benign to palpation, UA not indicative of infection. Unlikely CNS infection. Patient not hypotensive, not in shock, lactic acid <4, no indication for 30cc/kg IV fluid bolus. Clinically concern for COVID19 is high, however rapid swab was negative, will send for confirmatory PCR. Lab wokrup with acute renal failure and hypernatremia, suspect likely secondary to dehydration. I discussed case with Dr. Riaz Payne who agrees with current management plan and recommends continued rehydration with normal saline. I held extensive discussion over the phone with patient's spouse and daughter regarding the critical nature of his illness and significant possibility of decompensation despite medical treatment. Daughter notes that he recovered well from a previous pneumonia earlier this year and would like to continue treatment with fluids and antibiotics, however family is interested in options for home care or hospice. They again affirm they do not want invasive procedures, CPR or intubation. I discussed case with Dr. James Huntleyty for internal medicine service who will admit for further evaluation and care. Critical Care Time    Upon my evaluation, this patient had a high probability of imminent or life-threatening deterioration due to acute respiratory failure, severe sepsis, acute encephalopathy which required my direct attention, intervention, and personal management. The total critical care time personally spent while evaluating and treating this patient was 36 minutes exclusive of any time spent doing separately billable procedures. This includes time at the bedside, data interpretation, medication management, monitoring for potential decompensation and physician consultation. Specifics of interventions taken and potentially life-threatening diagnostic considerations are listed above in the medical decision making. Final Impression  1. Acute respiratory failure with hypoxemia (HCC)    2. Severe sepsis ([address]    3. HCAP (healthcare-associated pneumonia)    4. Acute renal failure, unspecified acute renal failure type ([address]    5. Hypernatremia    6. Hypokalemia        Blood pressure 109/82, pulse 93, temperature 98.1 °F (36.7 °C), temperature source Axillary, resp. rate 26, height 5' 10\" (1.778 m), weight 175 lb 4.3 oz (79.5 kg), SpO2 98 %.      Disposition:  DISPOSITION Admitted 10/31/2020 08:06:00 PM      Patient Referrals:  No follow-up provider specified. Discharge Medications:  New Prescriptions    No medications on file       Discontinued Medications:  Discontinued Medications    No medications on file       This chart was generated using the [employer] [facility] dictation system. I created this record but it may contain dictation errors given the limitations of this technology.     Crystal Sidhu DO (electronically signed)  Attending Emergency Physician       Crystal Sidhu DO  11/01/20 1046

## 2020-10-31 NOTE — ED NOTES
Bed: A-15  Expected date:   Expected time:   Means of arrival: [facility]  Comments:  89M unresponsive  88% 10L     Suha Palafox RN  10/31/20 0323

## 2020-11-01 PROBLEM — N17.9 AKI (ACUTE KIDNEY INJURY) (HCC): Status: ACTIVE | Noted: 2020-11-01

## 2020-11-01 PROBLEM — A41.9 SEPSIS WITH ACUTE RENAL FAILURE WITHOUT SEPTIC SHOCK (HCC): Status: ACTIVE | Noted: 2020-11-01

## 2020-11-01 PROBLEM — Z20.822 SUSPECTED COVID-19 VIRUS INFECTION: Status: RESOLVED | Noted: 2020-10-31 | Resolved: 2020-11-01

## 2020-11-01 PROBLEM — R65.20 SEPSIS WITH ACUTE RENAL FAILURE WITHOUT SEPTIC SHOCK (HCC): Status: ACTIVE | Noted: 2020-11-01

## 2020-11-01 PROBLEM — E86.0 DEHYDRATION: Status: ACTIVE | Noted: 2020-11-01

## 2020-11-01 PROBLEM — N17.9 SEPSIS WITH ACUTE RENAL FAILURE WITHOUT SEPTIC SHOCK (HCC): Status: ACTIVE | Noted: 2020-11-01

## 2020-11-01 LAB
ANION GAP SERPL CALCULATED.3IONS-SCNC: 12 MMOL/L (ref 3–16)
ANION GAP SERPL CALCULATED.3IONS-SCNC: 17 MMOL/L (ref 3–16)
BASOPHILS ABSOLUTE: 0.1 K/UL (ref 0–0.2)
BASOPHILS RELATIVE PERCENT: 0.3 %
BUN BLDV-MCNC: 95 MG/DL (ref 7–20)
BUN BLDV-MCNC: 96 MG/DL (ref 7–20)
CALCIUM SERPL-MCNC: 8.5 MG/DL (ref 8.3–10.6)
CALCIUM SERPL-MCNC: 8.6 MG/DL (ref 8.3–10.6)
CHLORIDE BLD-SCNC: 126 MMOL/L (ref 99–110)
CHLORIDE BLD-SCNC: 132 MMOL/L (ref 99–110)
CO2: 17 MMOL/L (ref 21–32)
CO2: 21 MMOL/L (ref 21–32)
CREAT SERPL-MCNC: 4.2 MG/DL (ref 0.8–1.3)
CREAT SERPL-MCNC: 4.3 MG/DL (ref 0.8–1.3)
EOSINOPHILS ABSOLUTE: 0 K/UL (ref 0–0.6)
EOSINOPHILS RELATIVE PERCENT: 0.1 %
GFR AFRICAN AMERICAN: 16
GFR AFRICAN AMERICAN: 16
GFR NON-AFRICAN AMERICAN: 13
GFR NON-AFRICAN AMERICAN: 13
GLUCOSE BLD-MCNC: 132 MG/DL (ref 70–99)
GLUCOSE BLD-MCNC: 150 MG/DL (ref 70–99)
HCT VFR BLD CALC: 40.9 % (ref 40.5–52.5)
HEMOGLOBIN: 12.7 G/DL (ref 13.5–17.5)
LYMPHOCYTES ABSOLUTE: 1.2 K/UL (ref 1–5.1)
LYMPHOCYTES RELATIVE PERCENT: 7.1 %
MCH RBC QN AUTO: 31.7 PG (ref 26–34)
MCHC RBC AUTO-ENTMCNC: 31 G/DL (ref 31–36)
MCV RBC AUTO: 102.4 FL (ref 80–100)
MONOCYTES ABSOLUTE: 0.5 K/UL (ref 0–1.3)
MONOCYTES RELATIVE PERCENT: 3.2 %
NEUTROPHILS ABSOLUTE: 14.9 K/UL (ref 1.7–7.7)
NEUTROPHILS RELATIVE PERCENT: 89.3 %
PDW BLD-RTO: 15.4 % (ref 12.4–15.4)
PLATELET # BLD: 195 K/UL (ref 135–450)
PMV BLD AUTO: 9.9 FL (ref 5–10.5)
POTASSIUM SERPL-SCNC: 3.1 MMOL/L (ref 3.5–5.1)
POTASSIUM SERPL-SCNC: 3.6 MMOL/L (ref 3.5–5.1)
PROCALCITONIN: 8.41 NG/ML (ref 0–0.15)
RBC # BLD: 4 M/UL (ref 4.2–5.9)
SARS-COV-2, PCR: NOT DETECTED
SODIUM BLD-SCNC: 160 MMOL/L (ref 136–145)
SODIUM BLD-SCNC: 165 MMOL/L (ref 136–145)
WBC # BLD: 16.7 K/UL (ref 4–11)

## 2020-11-01 PROCEDURE — 94761 N-INVAS EAR/PLS OXIMETRY MLT: CPT

## 2020-11-01 PROCEDURE — 36415 COLL VENOUS BLD VENIPUNCTURE: CPT

## 2020-11-01 PROCEDURE — 6360000002 HC RX W HCPCS: Performed by: FAMILY MEDICINE

## 2020-11-01 PROCEDURE — 6360000002 HC RX W HCPCS: Performed by: INTERNAL MEDICINE

## 2020-11-01 PROCEDURE — 84145 PROCALCITONIN (PCT): CPT

## 2020-11-01 PROCEDURE — 2500000003 HC RX 250 WO HCPCS: Performed by: FAMILY MEDICINE

## 2020-11-01 PROCEDURE — 2580000003 HC RX 258: Performed by: FAMILY MEDICINE

## 2020-11-01 PROCEDURE — 2580000003 HC RX 258: Performed by: INTERNAL MEDICINE

## 2020-11-01 PROCEDURE — 6360000002 HC RX W HCPCS: Performed by: NURSE PRACTITIONER

## 2020-11-01 PROCEDURE — 80048 BASIC METABOLIC PNL TOTAL CA: CPT

## 2020-11-01 PROCEDURE — 85025 COMPLETE CBC W/AUTO DIFF WBC: CPT

## 2020-11-01 PROCEDURE — 1200000000 HC SEMI PRIVATE

## 2020-11-01 PROCEDURE — 2700000000 HC OXYGEN THERAPY PER DAY

## 2020-11-01 RX ORDER — SODIUM CHLORIDE 0.9 % (FLUSH) 0.9 %
10 SYRINGE (ML) INJECTION EVERY 12 HOURS SCHEDULED
Status: DISCONTINUED | OUTPATIENT
Start: 2020-11-01 | End: 2020-11-03 | Stop reason: HOSPADM

## 2020-11-01 RX ORDER — SODIUM CHLORIDE 9 MG/ML
INJECTION, SOLUTION INTRAVENOUS CONTINUOUS
Status: DISCONTINUED | OUTPATIENT
Start: 2020-11-01 | End: 2020-11-03 | Stop reason: HOSPADM

## 2020-11-01 RX ORDER — MORPHINE SULFATE 2 MG/ML
1 INJECTION, SOLUTION INTRAMUSCULAR; INTRAVENOUS
Status: DISCONTINUED | OUTPATIENT
Start: 2020-11-01 | End: 2020-11-03 | Stop reason: HOSPADM

## 2020-11-01 RX ORDER — PROMETHAZINE HYDROCHLORIDE 25 MG/1
12.5 TABLET ORAL EVERY 6 HOURS PRN
Status: DISCONTINUED | OUTPATIENT
Start: 2020-11-01 | End: 2020-11-03 | Stop reason: HOSPADM

## 2020-11-01 RX ORDER — DEXAMETHASONE SODIUM PHOSPHATE 10 MG/ML
10 INJECTION, SOLUTION INTRAMUSCULAR; INTRAVENOUS DAILY
Status: DISCONTINUED | OUTPATIENT
Start: 2020-11-01 | End: 2020-11-02

## 2020-11-01 RX ORDER — ACETAMINOPHEN 325 MG/1
650 TABLET ORAL EVERY 6 HOURS PRN
Status: DISCONTINUED | OUTPATIENT
Start: 2020-11-01 | End: 2020-11-03 | Stop reason: HOSPADM

## 2020-11-01 RX ORDER — LORAZEPAM 2 MG/ML
0.5 INJECTION INTRAMUSCULAR
Status: DISCONTINUED | OUTPATIENT
Start: 2020-11-01 | End: 2020-11-03 | Stop reason: HOSPADM

## 2020-11-01 RX ORDER — ACETAMINOPHEN 650 MG/1
650 SUPPOSITORY RECTAL EVERY 6 HOURS PRN
Status: DISCONTINUED | OUTPATIENT
Start: 2020-11-01 | End: 2020-11-03 | Stop reason: HOSPADM

## 2020-11-01 RX ORDER — LINEZOLID 2 MG/ML
600 INJECTION, SOLUTION INTRAVENOUS EVERY 12 HOURS
Status: DISCONTINUED | OUTPATIENT
Start: 2020-11-01 | End: 2020-11-03 | Stop reason: HOSPADM

## 2020-11-01 RX ORDER — SODIUM CHLORIDE 0.9 % (FLUSH) 0.9 %
10 SYRINGE (ML) INJECTION PRN
Status: DISCONTINUED | OUTPATIENT
Start: 2020-11-01 | End: 2020-11-03 | Stop reason: HOSPADM

## 2020-11-01 RX ORDER — ONDANSETRON 2 MG/ML
4 INJECTION INTRAMUSCULAR; INTRAVENOUS EVERY 6 HOURS PRN
Status: DISCONTINUED | OUTPATIENT
Start: 2020-11-01 | End: 2020-11-03 | Stop reason: HOSPADM

## 2020-11-01 RX ORDER — POTASSIUM CHLORIDE 7.45 MG/ML
10 INJECTION INTRAVENOUS
Status: COMPLETED | OUTPATIENT
Start: 2020-11-01 | End: 2020-11-02

## 2020-11-01 RX ADMIN — POTASSIUM CHLORIDE 10 MEQ: 7.46 INJECTION, SOLUTION INTRAVENOUS at 22:16

## 2020-11-01 RX ADMIN — POTASSIUM CHLORIDE 10 MEQ: 7.46 INJECTION, SOLUTION INTRAVENOUS at 23:33

## 2020-11-01 RX ADMIN — METRONIDAZOLE 500 MG: 500 INJECTION, SOLUTION INTRAVENOUS at 19:49

## 2020-11-01 RX ADMIN — LINEZOLID 600 MG: 600 INJECTION, SOLUTION INTRAVENOUS at 13:30

## 2020-11-01 RX ADMIN — CEFEPIME HYDROCHLORIDE 1 G: 1 INJECTION, POWDER, FOR SOLUTION INTRAMUSCULAR; INTRAVENOUS at 22:15

## 2020-11-01 RX ADMIN — METRONIDAZOLE 500 MG: 500 INJECTION, SOLUTION INTRAVENOUS at 11:54

## 2020-11-01 RX ADMIN — CEFEPIME HYDROCHLORIDE 1 G: 1 INJECTION, POWDER, FOR SOLUTION INTRAMUSCULAR; INTRAVENOUS at 11:50

## 2020-11-01 RX ADMIN — SODIUM CHLORIDE: 9 INJECTION, SOLUTION INTRAVENOUS at 14:30

## 2020-11-01 RX ADMIN — Medication 10 ML: at 22:16

## 2020-11-01 RX ADMIN — DEXAMETHASONE SODIUM PHOSPHATE 10 MG: 10 INJECTION, SOLUTION INTRAMUSCULAR; INTRAVENOUS at 11:44

## 2020-11-01 RX ADMIN — MORPHINE SULFATE 2 MG: 2 INJECTION, SOLUTION INTRAMUSCULAR; INTRAVENOUS at 03:03

## 2020-11-01 ASSESSMENT — PAIN SCALES - PAIN ASSESSMENT IN ADVANCED DEMENTIA (PAINAD)
BODYLANGUAGE: 0
FACIALEXPRESSION: 0
CONSOLABILITY: 0
BREATHING: 0
BODYLANGUAGE: 0
BREATHING: 0
BODYLANGUAGE: 0
NEGVOCALIZATION: 0

## 2020-11-01 ASSESSMENT — PAIN SCALES - GENERAL
PAINLEVEL_OUTOF10: 0
PAINLEVEL_OUTOF10: 5

## 2020-11-01 NOTE — PROGRESS NOTES
H/p dictation id [id number]. Date of service 10/31/2020. Severe sepsis. Acute encephalopathy. Hcap. Advanced dementia. dnr cc. Hospice consulted.

## 2020-11-01 NOTE — ED NOTES
Pt remains in no acute distress and responsive to voice only. Pt remains nonverbal. No behaviors or vocalizations indicating pain. Report called to [facility] and all questions answered.       Beatriz Osorio RN  11/01/20 1460

## 2020-11-01 NOTE — PROGRESS NOTES
UROBILINOGEN 0.2   BILIRUBINUR Negative   BLOODU Negative   GLUCOSEU Negative       Invalid input(s): ABG  Lab Results   Component Value Date    CALCIUM 9.4 10/31/2020       Assessment:    Active Problems:    Suspected COVID-19 virus infection  Resolved Problems:    * No resolved hospital problems. [facility] course: A 79 yo male with h/o severe dementia admitted with sob,lethargy. In the ed he was found to have a + covid test. And dehydration,with hypernatremia. Plan:  DIAZ  - crt baseline 1, now 2.7 on admission -> 4.2  -  due to dehydration  - IVF  - Avoid nephrotoxins  - he is not a dialysis candidate  - nephrology consulted    Hyponatremia   - due to dehydration  - Na 166 -> 160  - correction with IV Normal Saline with a goal not to exceed 0.5 mEq/Hr. received 1.5L ns since admission.   - Will check bmps q 6hrs  - consulted nephrology      Acute hypoxic respiratory failure, POA  - with criteria: < 90% on RA, accessory muscle use, RR> 18, due to PNA   - supplemental oxygen as necessary to keep SaO2 > 90%, not on O2 at home  - cxr with rt perihilar infiltrates    CAP  - possibly aspiration  - cefepime, linezolid, flagyl  - check blood and sputum cultures  - procalcitonin 3.5 -> 8.4, however his crt has doubled since admission as well    Dementia, severe  - nonverbal at baseline per his daughter.  However he does normally interact with people   - pt is at very high risk for delirium, will minimize sedating meds, remove calderon as soon as able, avoid constipation, control pain, frequent reorientation     Code status:  dnr-cc  DVT prophylaxis: [x] Lovenox  [] SQ Heparin  [] SCDs because of  [] warfarin/oral direct thrombin inhibitor [] Encourage ambulation      Disposition:  [] Home [] Rehab [] Psych [] SNF  [] LTAC  [] Transfer to ICU  [] Transfer to PCU [] Other: in pt> have consulted [facility] per his daughter's wishes, however she wants him to become more stable - hydrated, and iv abx for a couple of days before discharging with hospice. She wants him to come home with hospice.       Electronically signed by Meir Donald DO on 11/1/2020 at 9:26 AM

## 2020-11-01 NOTE — ED NOTES
Pts legs are restless and RR was 30, 2mg Morphine given IV for comfort.      Flavia Barone, RN  11/01/20 9303

## 2020-11-01 NOTE — H&P
[address] [place] [address]                              HISTORY AND PHYSICAL    PATIENT NAME: Lev Weaver                         :        10/06/1929  MED REC NO:   [id number]                          ROOM:       021  ACCOUNT NO:   [de-identified]                           ADMIT DATE: 10/31/2020  PROVIDER:     Katie Griffin MD    I obtained the history and performed the physical exam on the patient in  the emergency room on 10/31/2020. CHIEF COMPLAINT:  Encephalopathy and shortness of breath. HISTORY OF PRESENT ILLNESS:  The patient is a unable to provide any  history because of advanced dementia. The patient came from the nursing  home, ill-appearing, nonverbal, and extremely short of breath prior to  arrival.    PAST MEDICAL/PAST SURGICAL HISTORY:  1. Advanced dementia. 2.  Hypertension. 3.  Dyslipidemia. PAST SURGICAL HISTORY:  No major procedures. ALLERGIC HISTORY:  No known allergies. FAMILY HISTORY:  Unobtainable from the patient. SOCIAL HISTORY:  Nursing home resident. MEDICATIONS:  Nursing home medication list reviewed and documented in  the EMR. REVIEW OF SYSTEMS:  Unobtainable from the patient because of the  patient's encephalopathy and advanced dementia. PHYSICAL EXAMINATION:  VITAL SIGNS:  Temperature T-max 99.9, respiratory rate was around 40,  pulse 97, blood pressure 90/50, saturating 95%. CNS:  Obtunded, nonresponsive. PSYCH:  Not agitated. HEENT:  Eyes:  Pupils are bilaterally equal.  ENT:  Nasal flaring noted. RESPIRATORY SYSTEM:  Tachypneic with accessory muscle use. ABDOMEN:  Nondistended. MUSCULOSKELETAL:  No acute deformities. SKIN:  Appears clammy. DIAGNOSTIC DATA:  EKG shows sinus tachycardia. Ferritin 2959. Magnesium 3.2. Sodium 166, potassium 3.0. BUN 69,  creatinine 2.7. . D-dimer greater than 5250. . White  count 16.1. Sed rate 39. Procalcitonin 3.54. Chest x-ray shows right perihilar marking increase with possible  infiltrate. CONSULTATIONS:  Nephrology consult was requested in the ER. ASSESSMENT:  1. Severe sepsis and acute encephalopathy with healthcare associated  pneumonia with possibility of COVID-19 infection in a patient with  advanced dementia. 2.  Advanced dementia. PLAN OF CARE:  The patient is currently admitted to the Internal  Medicine Service. Long discussion was established by the ER physician  with the patient's family. The patient's family wishes the patient to  be a DNR comfort care with comfort measures and wishes to avail of  inpatient hospice consultation. I believe this is very appropriate. We  will respect the family's wishes and continue comfort measures in this  patient. Palliative Care and hospice consult have been requested. CODE STATUS:  DNRCC. EXPECTED LENGTH OF STAY:  More than two midnights based on the plan of  care above. RISK:  Very high with the likelihood of death very high during this  admission given the morbid status. DISPOSITION:  Admitted to Internal Medicine service.         Bg Schneider MD    D: 11/01/2020 6:25:57       T: 11/01/2020 9:15:23     [email or web address]  Job#: [id number]     Doc#: [id number]    CC:

## 2020-11-01 NOTE — ED NOTES
Dr. Lien Reynoso notified of critical BUN. NS IV 75 ml/hr ordered. See JASON Diana RN  11/01/20 5796

## 2020-11-01 NOTE — CONSULTS
Nephrology ([facility]) Consult Note    Dev Powers is a 80 y.o. male whom we were asked to see for hypernatremia and DIAZ. The patient was sent from NH with dyspnea x 2 hours. Patient is confused and unable to provide history. The patient is DNR. In the ER, he was noted to have hypernatremia and DIAZ. Not sure with what happened with IVF overnight that was discussed with ER physician. Past Medical History:  dementia  HTN  HLP    Review of System:  Otherwise unremarkable    Allergies:  Patient has no known allergies. Medications:  Current medications reviewed. Social History: N/A  Family Medical History:  Negative for kidney disase    Physical Exam:  Blood pressure (!) 117/53, pulse 74, temperature 97.7 °F (36.5 °C), temperature source Axillary, resp. rate 19, height 5' 10\" (1.778 m), weight 175 lb 4.3 oz (79.5 kg), SpO2 99 %.     General:  NAD, confused, ill-appearing, normal body habitus  HEENT:  PERRL, EOMI  Neck:  Supple, normal range of movement  Chest:  CTAB, good respiratory effort, good air movement  CV:  RRR, no murmurs or rubs, no carotid bruit, no abdominal bruits  Abdomen:  NTND, soft, +BS, no hepatosplenomegaly  Extremities:  No peripheral edema  Neurological:  Moving all four extremities, CN II-XII grossly intact  Lymphatics:  No palpable lymph nodes  Skin:  No rash, no jaundice  Psychiatric:  confused    Laboratory Studies:  Lab Results   Component Value Date/Time     (H) 11/01/2020 12:39 PM    K 3.6 11/01/2020 12:39 PM    K 3.0 (L) 10/31/2020 05:18 PM     (H) 11/01/2020 12:39 PM    CO2 17 (L) 11/01/2020 12:39 PM    BUN 95 (HH) 11/01/2020 12:39 PM    CREATININE 4.2 (H) 11/01/2020 12:39 PM    CALCIUM 8.5 11/01/2020 12:39 PM    MG 3.20 (H) 10/31/2020 05:18 PM     Lab Results   Component Value Date/Time    WBC 16.7 (H) 11/01/2020 12:39 PM    HGB 12.7 (L) 11/01/2020 12:39 PM    HCT 40.9 11/01/2020 12:39 PM     11/01/2020 12:39 PM Assessment/Plan:  Reviewed old records and labs.     1) DIAZ    - baseline 10/14/20 Cr 1.0   - ddx: ATN vs pre-renal   - with Cr 2.7 last night to 4.2 today, I don't think this is fixable   - he is not a dialysis candidate due to DNR:CC and advanced dementia    2) hypernatremia   - on 0.9NS, which is relative hypotonic   - better from last night    3) advanced dementia   - DNR:CC   - d/w Dr. Amish Araya, who spoke with daughter   - hospice is planned    4) ID   - on cefepime, flagyl, and zyvox   - covid-19 negative

## 2020-11-01 NOTE — ED NOTES
Left and right hand IV sites assessed and flushed with NS.      [address] [facility] [place], RN  11/01/20 1153

## 2020-11-01 NOTE — CONSULTS
Received nephrology consult for Chilango Gibbs, 10/6/1929, 015/A-15      We will see the patient in the morning. Case discussed with ER, Dr. Malika Wallis. However, we will be available for questions in the meantime. Please call [phone].

## 2020-11-01 NOTE — CARE COORDINATION
Discharge Planning:  ANABEL spoke with pts dgtr, Aryan Pal to discuss d/c planning. Aryan Pal expressed her frustration in the care that this pt has received at [facility] and stated that she wants this pt to return to his independent living apartment so that he is able to \"die at home. \"   When ANABEL asked Aryan Pal if she had a preference for the hospice referral, Aryan Pal stated that she would like for this SW to contact [facility]. ANABEL stated that this SW can reach out to Ryley Lazo to ensure that this is a provider that they contract with. Aryan Pal requested that this SW not do this. Aryan Pal stated that this pt is in an independetn living apartment, and the facility should not have a say over what agency this pts family decides to go with. Aryan Pal stated that the plan is for pt to have hospice care and private paid care so that he has 24hr care in the home. ANABEL contacted [facility] hospice [phone] and spoke with Edmundo Rasmussen. Referral initiated. ANABEL faxed updated notes and the hospice order to [facility] at [phone].   Electronically signed by Adriana Lincoln on 11/1/2020 at 10:41 AM

## 2020-11-02 VITALS
SYSTOLIC BLOOD PRESSURE: 117 MMHG | DIASTOLIC BLOOD PRESSURE: 68 MMHG | HEART RATE: 67 BPM | OXYGEN SATURATION: 96 % | HEIGHT: 70 IN | RESPIRATION RATE: 20 BRPM | WEIGHT: 174.6 LBS | BODY MASS INDEX: 25 KG/M2 | TEMPERATURE: 97.4 F

## 2020-11-02 LAB
ANION GAP SERPL CALCULATED.3IONS-SCNC: 11 MMOL/L (ref 3–16)
ANION GAP SERPL CALCULATED.3IONS-SCNC: 12 MMOL/L (ref 3–16)
BUN BLDV-MCNC: 96 MG/DL (ref 7–20)
BUN BLDV-MCNC: 99 MG/DL (ref 7–20)
CALCIUM SERPL-MCNC: 8.3 MG/DL (ref 8.3–10.6)
CALCIUM SERPL-MCNC: 8.4 MG/DL (ref 8.3–10.6)
CHLORIDE BLD-SCNC: 132 MMOL/L (ref 99–110)
CHLORIDE BLD-SCNC: 132 MMOL/L (ref 99–110)
CO2: 18 MMOL/L (ref 21–32)
CO2: 18 MMOL/L (ref 21–32)
CREAT SERPL-MCNC: 4.2 MG/DL (ref 0.8–1.3)
CREAT SERPL-MCNC: 4.3 MG/DL (ref 0.8–1.3)
EKG ATRIAL RATE: 101 BPM
EKG DIAGNOSIS: NORMAL
EKG P AXIS: 10 DEGREES
EKG P-R INTERVAL: 144 MS
EKG Q-T INTERVAL: 372 MS
EKG QRS DURATION: 100 MS
EKG QTC CALCULATION (BAZETT): 482 MS
EKG R AXIS: -20 DEGREES
EKG T AXIS: 114 DEGREES
EKG VENTRICULAR RATE: 101 BPM
GFR AFRICAN AMERICAN: 16
GFR AFRICAN AMERICAN: 16
GFR NON-AFRICAN AMERICAN: 13
GFR NON-AFRICAN AMERICAN: 13
GLUCOSE BLD-MCNC: 138 MG/DL (ref 70–99)
GLUCOSE BLD-MCNC: 216 MG/DL (ref 70–99)
POTASSIUM SERPL-SCNC: 3.6 MMOL/L (ref 3.5–5.1)
POTASSIUM SERPL-SCNC: 4.7 MMOL/L (ref 3.5–5.1)
SODIUM BLD-SCNC: 161 MMOL/L (ref 136–145)
SODIUM BLD-SCNC: 162 MMOL/L (ref 136–145)

## 2020-11-02 PROCEDURE — 6360000002 HC RX W HCPCS: Performed by: FAMILY MEDICINE

## 2020-11-02 PROCEDURE — 2500000003 HC RX 250 WO HCPCS: Performed by: FAMILY MEDICINE

## 2020-11-02 PROCEDURE — 2700000000 HC OXYGEN THERAPY PER DAY

## 2020-11-02 PROCEDURE — 36415 COLL VENOUS BLD VENIPUNCTURE: CPT

## 2020-11-02 PROCEDURE — 94760 N-INVAS EAR/PLS OXIMETRY 1: CPT

## 2020-11-02 PROCEDURE — 2580000003 HC RX 258: Performed by: FAMILY MEDICINE

## 2020-11-02 PROCEDURE — 2580000003 HC RX 258: Performed by: NURSE PRACTITIONER

## 2020-11-02 PROCEDURE — 80048 BASIC METABOLIC PNL TOTAL CA: CPT

## 2020-11-02 PROCEDURE — 6360000002 HC RX W HCPCS: Performed by: NURSE PRACTITIONER

## 2020-11-02 PROCEDURE — 93010 ELECTROCARDIOGRAM REPORT: CPT | Performed by: INTERNAL MEDICINE

## 2020-11-02 RX ADMIN — POTASSIUM CHLORIDE 10 MEQ: 7.46 INJECTION, SOLUTION INTRAVENOUS at 00:12

## 2020-11-02 RX ADMIN — LINEZOLID 600 MG: 600 INJECTION, SOLUTION INTRAVENOUS at 00:12

## 2020-11-02 RX ADMIN — DEXAMETHASONE SODIUM PHOSPHATE 10 MG: 10 INJECTION, SOLUTION INTRAMUSCULAR; INTRAVENOUS at 09:04

## 2020-11-02 RX ADMIN — METRONIDAZOLE 500 MG: 500 INJECTION, SOLUTION INTRAVENOUS at 11:21

## 2020-11-02 RX ADMIN — SODIUM CHLORIDE: 9 INJECTION, SOLUTION INTRAVENOUS at 00:12

## 2020-11-02 RX ADMIN — METRONIDAZOLE 500 MG: 500 INJECTION, SOLUTION INTRAVENOUS at 03:59

## 2020-11-02 RX ADMIN — SODIUM CHLORIDE: 9 INJECTION, SOLUTION INTRAVENOUS at 09:03

## 2020-11-02 RX ADMIN — CEFEPIME HYDROCHLORIDE 1 G: 1 INJECTION, POWDER, FOR SOLUTION INTRAMUSCULAR; INTRAVENOUS at 11:21

## 2020-11-02 RX ADMIN — POTASSIUM CHLORIDE 10 MEQ: 7.46 INJECTION, SOLUTION INTRAVENOUS at 01:06

## 2020-11-02 RX ADMIN — METRONIDAZOLE 500 MG: 500 INJECTION, SOLUTION INTRAVENOUS at 17:05

## 2020-11-02 RX ADMIN — LINEZOLID 600 MG: 600 INJECTION, SOLUTION INTRAVENOUS at 12:42

## 2020-11-02 RX ADMIN — SODIUM CHLORIDE: 9 INJECTION, SOLUTION INTRAVENOUS at 17:02

## 2020-11-02 ASSESSMENT — PAIN SCALES - PAIN ASSESSMENT IN ADVANCED DEMENTIA (PAINAD)
CONSOLABILITY: 0
CONSOLABILITY: 0
NEGVOCALIZATION: 0
BREATHING: 0
NEGVOCALIZATION: 0
TOTALSCORE: 0
TOTALSCORE: 0
FACIALEXPRESSION: 0
BODYLANGUAGE: 0
BREATHING: 0
TOTALSCORE: 0
BREATHING: 0
CONSOLABILITY: 0
CONSOLABILITY: 0
FACIALEXPRESSION: 0
TOTALSCORE: 0
BREATHING: 0
BREATHING: 0
TOTALSCORE: 0
CONSOLABILITY: 0
BREATHING: 0
CONSOLABILITY: 0
BODYLANGUAGE: 0

## 2020-11-02 NOTE — PROGRESS NOTES
Pt in bed alert, able to node his head with  yes/ NO answer, but non-verbal. Pt on high Brennon oxygen 8L. VSS. Iv fluids infusing. Pt turning and repositioning q2hrs. Call light in reach, bed low and locked, alarm on. Will continue to monitor and reassess this patient. .Electronically signed by Scott Dimas RN on 11/2/2020 at 3:01 AM

## 2020-11-02 NOTE — DISCHARGE SUMMARY
Hospital Medicine Discharge Summary    Patient ID: Chilango Gibbs      Patient's PCP: No primary care provider on file. Admit Date: 10/31/2020     Discharge Date:   11/2/2020    Admitting Physician: John Capellan MD     Discharge Physician: Genia Johnson MD     Discharge Diagnoses: Active Hospital Problems    Diagnosis Date Noted    DIAZ (acute kidney injury) ([facility] [address] [N17.9] 11/01/2020    Dehydration [E86.0] 11/01/2020    Sepsis with acute renal failure without septic shock ([facility] [address] [A41.9, R65.20, N17.9] 11/01/2020       The patient was seen and examined on day of discharge and this discharge summary is in conjunction with any daily progress note from day of discharge. Hospital Course:     Severe sepsis with acute encephalopathy:  - due to healcare associated pneumonia vs aspiration  - Covid test negative  - aspiration a risk due to dementia  - cont cefepime flagyl. Can stop linezolid due to lower risk for MRSA and doing hospice on D/C    DIAZ on CKD:  - baseline creatinine 1.0. Cretinine on presentation was 2.7, trended up to 4.2 despite fluids  - nephrology following:  Poor prognosis and not a candidate for dialysis --> recommending hospice    Poor prognosis:  - [facility] involved to transition patient's care to hospice facility    Advanced dementia:  - supportive care per hospice      Exam:     /68   Pulse 67   Temp 97.4 °F (36.3 °C) (Oral)   Resp 20   Ht 5' 10\" (1.778 m)   Wt 174 lb 9.7 oz (79.2 kg)   SpO2 96%   BMI 25.05 kg/m²     General appearance: No apparent distress, appears stated age and cooperative. HEENT: Pupils equal, round, and reactive to light. Conjunctivae/corneas clear. Neck: Supple, with full range of motion. No jugular venous distention. Trachea midline. Respiratory:  Normal respiratory effort. Diffuse rhonchi. Cardiovascular: Regular rate and rhythm with normal S1/S2 without murmurs, rubs or gallops.   Abdomen: Soft, non-tender, non-distended with normal bowel sounds. Musculoskelatal: No clubbing, cyanosis or edema bilaterally. Full range of motion without deformity. Skin: Skin color, texture, turgor normal.  No rashes or lesions. Neurologic:  CN's 2-12 appear intact, moves all extremities well   Psychiatric: Alert, oriented x0, inatentive, not following commands      Consults:     IP CONSULT TO NEPHROLOGY  IP CONSULT TO HOSPICE    Significant Diagnostic Studies:       XR FOOT RIGHT (MIN 3 VIEWS) [[id number]]  Collected: 10/31/20 1746        Order Status: Completed  Updated: 10/31/20 1751       Narrative:         EXAMINATION:   XRAY VIEWS OF THE RIGHT FOOT     10/31/2020 5:32 pm     COMPARISON:   None. HISTORY:   ORDERING SYSTEM PROVIDED HISTORY: heel wound   TECHNOLOGIST PROVIDED HISTORY:   Reason for exam:->heel wound   Reason for Exam: diabetic ulcer heel area   Acuity: Acute   Type of Exam: Initial     FINDINGS:   Three views demonstrate no plain film evidence of osteomyelitis.  Bandage   overlies the posterior heel with no deep soft tissue gas.  Mild osteopenia   with no significant arthritic changes.  Scattered vascular calcification.       Impression:         No acute osseous abnormality of the right foot or plain film evidence of   osteomyelitis.       XR CHEST PORTABLE [[id number]]  Collected: 10/31/20 1728       Order Status: Completed  Updated: 10/31/20 1733       Narrative:         EXAMINATION:   ONE XRAY VIEW OF THE CHEST     10/31/2020 5:09 pm     COMPARISON:   10/12/2020. HISTORY:   ORDERING SYSTEM PROVIDED HISTORY: AMS, resp failure   TECHNOLOGIST PROVIDED HISTORY:   Reason for exam:->AMS, resp failure   Reason for Exam: ams     FINDINGS:   The cardiomediastinal silhouette is stable.  Aortic vascular calcification.    Mild increase in the right perihilar markings.  The lungs otherwise are clear   with no focal consolidation, significant pleural fluid or evidence of overt   failure.       Impression:         Mild increase in the right perihilar markings, possibly infiltrate. Otherwise unremarkable chest.               PCP/SNF to follow up: Hospice care    Disposition:  Hospice facility    Condition on D/C:  Guarded, poor prognosis    Discharge Instructions/Follow-up:  Hospice care    Code Status:  DNR-CC     Activity: activity as tolerated    Diet: As tolerated    Labs: For convenience and continuity at follow-up the following most recent labs are provided:      CBC:    Lab Results   Component Value Date    WBC 16.7 11/01/2020    HGB 12.7 11/01/2020    HCT 40.9 11/01/2020     11/01/2020       Renal:    Lab Results   Component Value Date     11/02/2020    K 4.7 11/02/2020    K 3.0 10/31/2020     11/02/2020    CO2 18 11/02/2020    BUN 96 11/02/2020    CREATININE 4.2 11/02/2020    CALCIUM 8.3 11/02/2020       Discharge Medications:     Current Discharge Medication List           Details   levETIRAcetam (KEPPRA) 100 MG/ML solution Take 5 mLs by mouth 2 times daily  Qty: 300 mL, Refills: 0      Multiple Vitamins-Minerals (THERAPEUTIC MULTIVITAMIN-MINERALS) tablet Take 1 tablet by mouth daily      aspirin 81 MG EC tablet Take 81 mg by mouth daily      vitamin D (CHOLECALCIFEROL) 25 MCG (1000 UT) TABS tablet Take 5,000 Units by mouth daily      loratadine (CLARITIN) 10 MG tablet Take 10 mg by mouth daily as needed (allergies)      levothyroxine (SYNTHROID) 175 MCG tablet Take 175 mcg by mouth Daily      pravastatin (PRAVACHOL) 40 MG tablet Take 40 mg by mouth nightly      polyethylene glycol (MIRALAX) PACK packet Take 17 g by mouth 2 times daily as needed (constipation)       finasteride (PROSCAR) 5 MG tablet Take 5 mg by mouth daily             Time Spent on discharge is more than 1hr in the examination, evaluation, counseling and review of medications and discharge plan. Signed: Antwan Interiano MD   11/2/2020      Thank you No primary care provider on file. for the opportunity to be involved in this patient's care.  If you have any questions or concerns please feel free to contact me at [phone].

## 2020-11-02 NOTE — PROGRESS NOTES
Pt has critical lab , BUN 96, NP Van Hester notified, new orders in(see MAR). Orders carried out. .Electronically signed by Torri Luevano RN on 11/2/2020 at 2:56 AM

## 2020-11-02 NOTE — CARE COORDINATION
11/2 spoke w/ Bertram Stanton with [facility] hospice-- he states family is agreeable to Hospice care-- he is coordinating transfer to IP unit.   Electronically signed by Felicia Krueger on 11/2/2020 at 3:28 PM

## 2020-11-02 NOTE — PROGRESS NOTES
Perfect serve to Dr. Miranda Calderón: The family is at the bedside and they would like you to update them before Hospice sees them. thanks.

## 2020-11-02 NOTE — PROGRESS NOTES
Critical Lab of  and BUN 99 notified to NP Saima. Will monitor. .Electronically signed by Cony Gómez RN on 11/2/2020 at 2:57 AM

## 2020-11-02 NOTE — CARE COORDINATION
11/2 called [facility] to confirm appointment -- spoke w/ boy in admissions # [phone] - meeting will be today--unsure of time. Will follow.   Electronically signed by Raeann Rosales on 11/2/2020 at 8:31 AM

## 2020-11-02 NOTE — DISCHARGE INSTR - COC
11/02/20 174 lb 9.7 oz (79.2 kg)     Mental Status:  {IP PT MENTAL STATUS:20030:::0}    IV Access:  { RIC IV ACCESS:951298471:::0}    Nursing Mobility/ADLs:  Walking   {CHP DME ADLs:545494383:::0}  Transfer  {CHP DME ADLs:482910749:::0}  Bathing  {CHP DME ADLs:057277938:::0}  Dressing  {CHP DME ADLs:368093128:::0}  Toileting  {CHP DME ADLs:032093152:::0}  Feeding  {CHP DME ADLs:246972858:::0}  Med Admin  {CHP DME ADLs:105251877:::0}  Med Delivery   { RIC MED Delivery:413122278:::0}    Wound Care Documentation and Therapy:  Wound 11/01/20 Sacrum DTI on R buttock surrounded by Stage I, II, and III breakdown in gluteal cleft with skin slough/tear (Active)   Wound Image   11/02/20 1355   Wound Etiology Deep tissue/Injury 11/02/20 1355   Dressing Status Other (Comment) 11/02/20 0800   Wound Cleansed Wound cleanser 11/01/20 1800   Dressing/Treatment Zinc paste 11/02/20 0800   Wound Length (cm) 6 cm 11/02/20 1355   Wound Width (cm) 7 cm 11/02/20 1355   Wound Surface Area (cm^2) 42 cm^2 11/02/20 1355   Change in Wound Size % (l*w) 41.67 11/02/20 1355   Wound Assessment Purple/maroon;Denuded;Erythema 11/02/20 1355   Drainage Amount Small 11/02/20 1355   Drainage Description Sanguinous 11/02/20 1355   Odor None 11/02/20 1355   Mary-wound Assessment Non-blanchable erythema;Denuded 11/02/20 1355   Margins Undefined edges; Unattached edges 11/02/20 0800   Wound Thickness Description not for Pressure Injury Partial thickness 11/02/20 0800   Number of days: 0       Wound 11/01/20 Heel Right Large patch of escar surrounded by non-blanchable redness, surrounded by blanchable redness (Active)   Wound Image   11/02/20 1355   Wound Etiology Pressure Unstageable 11/02/20 1355   Dressing Status New dressing applied 11/02/20 1355   Wound Cleansed Betadine/povidone iodine 11/02/20 1355   Dressing/Treatment Betadine swabs/povidone iodine;Non adherent; Roll gauze 11/02/20 1355   Offloading for Diabetic Foot Ulcers Offloading boot 11/02/20 0800   Wound Length (cm) 7.5 cm 11/02/20 1355   Wound Width (cm) 9.5 cm 11/02/20 1355   Wound Depth (cm) 0 cm 11/01/20 1800   Wound Surface Area (cm^2) 71.25 cm^2 11/02/20 1355   Change in Wound Size % (l*w) 1.04 11/02/20 1355   Wound Volume (cm^3) 0 cm^3 11/01/20 1800   Wound Assessment Eschar;Slough;Granulation tissue; Non-blanchable erythema;Dry 11/02/20 1355   Drainage Amount Small 11/02/20 1355   Drainage Description Serosanguinous 11/02/20 1355   Odor None 11/02/20 1355   Mary-wound Assessment Intact 11/02/20 1355   Number of days: 0       Wound 11/01/20 Foot Left; Inner (Active)   Wound Image   11/02/20 1355   Wound Etiology Deep tissue/Injury 11/02/20 1355   Dressing Status New dressing applied 11/01/20 2344   Wound Cleansed Not Cleansed 11/01/20 1800   Dressing/Treatment Barrier film 11/02/20 1355   Offloading for Diabetic Foot Ulcers Offloading boot 11/02/20 0800   Wound Length (cm) 1.5 cm 11/02/20 1355   Wound Width (cm) 1 cm 11/02/20 1355   Wound Depth (cm) 0 cm 11/01/20 1800   Wound Surface Area (cm^2) 1.5 cm^2 11/02/20 1355   Change in Wound Size % (l*w) 31.51 11/02/20 1355   Wound Volume (cm^3) 0 cm^3 11/01/20 1800   Wound Assessment Purple/maroon;Dusky 11/02/20 1355   Drainage Amount None 11/02/20 0800   Odor None 11/02/20 0800   Mary-wound Assessment Intact 11/02/20 1355   Number of days: 0       Wound 11/02/20 Foot Left; Outer (Active)   Wound Image   11/02/20 1355   Wound Etiology Deep tissue/Injury 11/02/20 1355   Dressing/Treatment Barrier film 11/02/20 1355   Wound Length (cm) 2 cm 11/02/20 1355   Wound Width (cm) 2 cm 11/02/20 1355   Wound Surface Area (cm^2) 4 cm^2 11/02/20 1355   Wound Assessment Dry;Purple/maroon 11/02/20 1355   Mary-wound Assessment Intact 11/02/20 1355   Number of days: 0        Elimination:  Continence:    Bowel: {YES / DR:34319}  Bladder: {YES / QL:51684}  Urinary Catheter: {Urinary Catheter:476035409:::0}   Colostomy/Ileostomy/Ileal Conduit: {YES / :23766} Date of Last BM: ***    Intake/Output Summary (Last 24 hours) at 2020 1556  Last data filed at 2020 0903  Gross per 24 hour   Intake 2250 ml   Output --   Net 2250 ml     I/O last 3 completed shifts:   In: 2250 [I.V.:1500; IV Piggyback:750]  Out: -     Safety Concerns:     { RIC Safety Concerns:147122744:::0}    Impairments/Disabilities:      [address]AR[address] Impairments/Disabilities:426182247:::0}    Nutrition Therapy:  Current Nutrition Therapy:   [address] RCI Diet List:224550943:::0}    Routes of Feeding: {[facility] DME Other Feedings:446953844:::0}  Liquids: {Slp liquid thickness:76061}  Daily Fluid Restriction: {P DME Yes amt example:502039668:::0}  Last Modified Barium Swallow with Video (Video Swallowing Test): {Done Not Done HOLR:324639364:::2}    Treatments at the Time of Hospital Discharge:   Respiratory Treatments: ***  Oxygen Therapy:  {Therapy; copd oxygen:87462:::0}  Ventilator:    { CC Vent List:927169521:::0}    Rehab Therapies: {THERAPEUTIC INTERVENTION:4268939466}  Weight Bearing Status/Restrictions: { CC Weight Bearin:::0}  Other Medical Equipment (for information only, NOT a DME order):  {EQUIPMENT:682674327}  Other Treatments: ***    Patient's personal belongings (please select all that are sent with patient):  {[facility] DME Belongings:511776299:::0}    RN SIGNATURE:  {Esignature:575269417:::0}    CASE MANAGEMENT/SOCIAL WORK SECTION    Inpatient Status Date: ***    Readmission Risk Assessment Score:  Readmission Risk              Risk of Unplanned Readmission:        15           Discharging to Facility/ Agency   Name:   Address:  Phone:  Fax:    Dialysis Facility (if applicable)   Name:  Address:  Dialysis Schedule:  Phone:  Fax:    / signature: {Esignature:479241213:::0}    PHYSICIAN SECTION    Prognosis: Poor    Condition at Discharge: Terminal    Rehab Potential (if transferring to Rehab): Poor    Recommended Labs or Other Treatments After Discharge:

## 2020-11-02 NOTE — CARE COORDINATION
[address] [address] Continence Nurse  Consult Note       NAME:  Willeen Osgood  MEDICAL RECORD NUMBER:  [id number]  AGE: 80 y.o. GENDER: male  : 10/6/1929  TODAY'S DATE:  2020    Subjective   Reason for WOCN Evaluation and Assessment: DTI Sacrum, unstageable R heel, x2 DTI left foot. Present on admission. Willeen Osgood is a 80 y.o. male referred by:   [] Physician  [x] Nursing  [] Other:     Wound Identification:  Wound Type: pressure  Contributing Factors: chronic pressure and decreased mobility    Wound History: From nursing home, wounds present on admission, advanced dementia; plans for d/c to hospice  Current Wound Care Treatment:  Zinc barrier    Patient Goal of Care:  [x] Wound Healing  [] Odor Control  [] Palliative Care  [] Pain Control   [] Other:         PAST MEDICAL HISTORY        Diagnosis Date    Alzheimer disease ([facility] [address]     Heart murmur     Hyperlipidemia     Thyroid disease        PAST SURGICAL HISTORY    History reviewed. No pertinent surgical history. FAMILY HISTORY    History reviewed. No pertinent family history. SOCIAL HISTORY    Social History     Tobacco Use    Smoking status: Never Smoker    Smokeless tobacco: Never Used   Substance Use Topics    Alcohol use: No    Drug use: Not on file       ALLERGIES    No Known Allergies    MEDICATIONS    No current facility-administered medications on file prior to encounter.       Current Outpatient Medications on File Prior to Encounter   Medication Sig Dispense Refill    levETIRAcetam (KEPPRA) 100 MG/ML solution Take 5 mLs by mouth 2 times daily 300 mL 0    Multiple Vitamins-Minerals (THERAPEUTIC MULTIVITAMIN-MINERALS) tablet Take 1 tablet by mouth daily      aspirin 81 MG EC tablet Take 81 mg by mouth daily      vitamin D (CHOLECALCIFEROL) 25 MCG (1000 UT) TABS tablet Take 5,000 Units by mouth daily      loratadine (CLARITIN) 10 MG tablet Take 10 mg by mouth daily as needed (allergies)      levothyroxine (SYNTHROID) 175 edges;Unattached edges 11/02/20 0800   Wound Thickness Description not for Pressure Injury Partial thickness 11/02/20 0800   Number of days: 0       Wound 11/01/20 Heel Right Large patch of escar surrounded by non-blanchable redness, surrounded by blanchable redness (Active)   Wound Image   11/02/20 1355   Wound Etiology Pressure Unstageable 11/02/20 1355   Dressing Status New dressing applied 11/02/20 1355   Wound Cleansed Betadine/povidone iodine 11/02/20 1355   Dressing/Treatment Betadine swabs/povidone iodine;Non adherent; Roll gauze 11/02/20 1355   Offloading for Diabetic Foot Ulcers Offloading boot 11/02/20 0800   Wound Length (cm) 7.5 cm 11/02/20 1355   Wound Width (cm) 9.5 cm 11/02/20 1355   Wound Depth (cm) 0 cm 11/01/20 1800   Wound Surface Area (cm^2) 71.25 cm^2 11/02/20 1355   Change in Wound Size % (l*w) 1.04 11/02/20 1355   Wound Volume (cm^3) 0 cm^3 11/01/20 1800   Wound Assessment Eschar;Slough;Granulation tissue; Non-blanchable erythema;Dry 11/02/20 1355   Drainage Amount Small 11/02/20 1355   Drainage Description Serosanguinous 11/02/20 1355   Odor None 11/02/20 1355   Mary-wound Assessment Intact 11/02/20 1355   Number of days: 0       Wound 11/01/20 Foot Left; Inner (Active)   Wound Image   11/02/20 1355   Wound Etiology Deep tissue/Injury 11/02/20 1355   Dressing Status New dressing applied 11/01/20 2344   Wound Cleansed Not Cleansed 11/01/20 1800   Dressing/Treatment Barrier film 11/02/20 1355   Offloading for Diabetic Foot Ulcers Offloading boot 11/02/20 0800   Wound Length (cm) 1.5 cm 11/02/20 1355   Wound Width (cm) 1 cm 11/02/20 1355   Wound Depth (cm) 0 cm 11/01/20 1800   Wound Surface Area (cm^2) 1.5 cm^2 11/02/20 1355   Change in Wound Size % (l*w) 31.51 11/02/20 1355   Wound Volume (cm^3) 0 cm^3 11/01/20 1800   Wound Assessment Purple/maroon;Dusky 11/02/20 1355   Drainage Amount None 11/02/20 0800   Odor None 11/02/20 0800   Mary-wound Assessment Intact 11/02/20 1355   Number of days: 0 Wound 11/02/20 Foot Left; Outer (Active)   Wound Image   11/02/20 1355   Wound Etiology Deep tissue/Injury 11/02/20 1355   Dressing/Treatment Barrier film 11/02/20 1355   Wound Length (cm) 2 cm 11/02/20 1355   Wound Width (cm) 2 cm 11/02/20 1355   Wound Surface Area (cm^2) 4 cm^2 11/02/20 1355   Wound Assessment Dry;Purple/maroon 11/02/20 1355   Mary-wound Assessment Intact 11/02/20 1355   Number of days: 0     Initial visit for several wounds. Skin assessed. Pt has x2 DTI to L foot, inner and outer. Barrier film applied. Pt has R heel unstageable. Betadine soak w/ versatel, kerlix applied. Large DTI to pt sacrum. Will plan to uze zinc barrier. Specialty bed already ordered by bedside RN. Plan   Plan of Care: Wound 11/01/20 Sacrum DTI on R buttock surrounded by Stage I, II, and III breakdown in gluteal cleft with skin slough/tear-Dressing/Treatment: Zinc paste  Wound 11/01/20 Heel Right Large patch of escar surrounded by non-blanchable redness, surrounded by blanchable redness-Dressing/Treatment: Betadine swabs/povidone iodine, Non adherent, Roll gauze  Wound 11/01/20 Foot Left; Inner-Dressing/Treatment: Barrier film  Wound 11/02/20 Foot Left; Outer-Dressing/Treatment: Barrier film   DTI sacrum- zinc barrier  DTI x2 L foot- barrier film  Unstageable R heel- betadine to stable eschar, versatel around to healthy red, kerliz; daily  Plans to d/c to hospice    Specialty Bed Required : Yes   [x] Low Air Loss   [] Pressure Redistribution  [] Fluid Immersion  [] Bariatric  [] Total Pressure Relief  [] Other:     Current Diet: Diet NPO Effective Now  Dietician consult:  No    Discharge Plan:  Placement for patient upon discharge: Hospice  Patient appropriate for Outpatient [address]: No    Referrals:  []   [] [address] [facility]  [] Supplies  [] Other    Patient/Caregiver Teaching:  Level of patient/caregiver understanding able to:   [] Indicates understanding       [x] Needs reinforcement  [] Unsuccessful      [] Verbal Understanding  [] Demonstrated understanding       [x] No evidence of learning  [] Refused teaching         [] N/A       Electronically signed by Tiffanie Colorado RN, on 11/2/2020 at 3:15 PM

## 2020-11-02 NOTE — PLAN OF CARE
Problem: Falls - Risk of:  Goal: Will remain free from falls  Description: Will remain free from falls  Outcome: Ongoing  Note: Patient educated on fall prevention. Call light is within reach, bed locked in lowest position, personal items within reach, and bed alarm is on. Will round on patient per unit guidelines. Goal: Absence of physical injury  Description: Absence of physical injury  Outcome: Ongoing  Note: Pt is free of injury. No injury noted. Fall precautions in place. Call light within reach. Will monitor.

## 2020-11-02 NOTE — PROGRESS NOTES
COVID-19 virus infection      PLAN:  1. DIAZ No improvement , UOP noted . He is not a dialysis candidate . Pts family decided to enroll him in hospice care . It is appropraite . Pt has multiple  comorbid conditions and advance age with dementia  2. CKD Sec to age/ comorbid conditions . 3. Hypernatremia on fluids   4. dw nurse in detail. Pt leaving today under hospice care . dw nurse in detail . Advised her to call if there is a change in plan . 5. Prognosis poor .      Donta Francois MD.FACP

## 2020-11-02 NOTE — PROGRESS NOTES
Patient in bed, he is awake right now, he is nonverbal. Advanced dementia pain scale score is 0. Patient has been turned frequently. Oxygen high flow is at 7 L and continues in place. He is a dependent patient. Vitals are stable. [facility] turned on to provide comfort.  Call light in reach, bed alarm set, will monitor

## 2020-11-03 NOTE — PROGRESS NOTES
Pt discharged to hospice care. Fluid stopped and IV removed. Transportation here with strecher. Transported in ambulance . Discharge instructions, Rx, and personal belongings given to pt. No questions, comments or concerns at this time.  Electronically signed by Jason Lubin RN on 11/2/2020 at 10:29 PM

## 2020-11-03 NOTE — PLAN OF CARE
Problem: Falls - Risk of:  Goal: Will remain free from falls  Description: Will remain free from falls  11/2/2020 2224 by Nieves Robledo RN  Outcome: Completed  Note: Patient educated on fall prevention. Call light is within reach, bed locked in lowest position, personal items within reach, and bed alarm is on. Will round on patient per unit guidelines. 11/2/2020 1936 by Mitchel Mariscal RN  Outcome: Met This Shift  Goal: Absence of physical injury  Description: Absence of physical injury  11/2/2020 2224 by Nieves Robledo RN  Outcome: Completed  Note: Pt is free of injury. No injury noted. Fall precautions in place. Call light within reach. Will monitor. 11/2/2020 1936 by Mitchel Mariscal RN  Outcome: Met This Shift     Problem: Skin Integrity:  Goal: Will show no infection signs and symptoms  Description: Will show no infection signs and symptoms  11/2/2020 2224 by Nieves Robledo RN  Outcome: Completed  Note: Pt assessed for infection, VVS, WBC being monitored.  Reviewed information with pt and family, pt verbalized understanding     11/2/2020 1936 by Mitchel Mariscal RN  Outcome: Met This Shift  Goal: Absence of new skin breakdown  Description: Absence of new skin breakdown  11/2/2020 2224 by Nieves Robledo RN  Outcome: Completed  11/2/2020 1936 by Mitchel Mariscal RN  Outcome: Met This Shift

## 2020-11-04 LAB
BLOOD CULTURE, ROUTINE: NORMAL
CULTURE, BLOOD 2: NORMAL

## 2020-12-01 PROBLEM — E86.0 DEHYDRATION: Status: RESOLVED | Noted: 2020-11-01 | Resolved: 2020-12-01

## 2024-08-14 NOTE — PROGRESS NOTES
PHYSICAL / OCCUPATIONAL THERAPY - DAILY TREATMENT NOTE    Patient Name: Jay Dangelo III    Date: 2024    : 1950  Insurance: Payor: MEDICARE / Plan: MEDICARE PART A AND B / Product Type: *No Product type* /      Patient  verified Yes     Visit #   Current / Total 4 26   Time   In / Out 3:23 pm 4:02 pm   Pain   In / Out 9/10  6/10   Subjective Functional Status/Changes: \"My back really hurts today.\"     TREATMENT AREA =  Other abnormalities of gait and mobility [R26.89]     OBJECTIVE         Therapeutic Procedures:    Tx Min Billable or 1:1 Min (if diff from Tx Min) Procedure, Rationale, Specifics   25 [id number] Therapeutic Exercise (timed):  increase ROM, strength, coordination, balance, and proprioception to improve patient's ability to progress to PLOF and address remaining functional goals. (see flow sheet as applicable)     Details if applicable:       14 8 [id number] Therapeutic Activity (timed):  use of dynamic activities replicating functional movements to increase ROM, strength, coordination, balance, and proprioception in order to improve patient's ability to progress to PLOF and address remaining functional goals.  (see flow sheet as applicable)     Details if applicable:                    39 32 [facility] Totals Reminder: bill using total billable min of TIMED therapeutic procedures (example: do not include dry needle or estim unattended, both untimed codes, in totals to left)  8-22 min = 1 unit; 23-37 min = 2 units; 38-52 min = 3 units; 53-67 min = 4 units; 68-82 min = 5 units   Total Total     [x]  Patient Education billed concurrently with other procedures   [x] Review HEP    [] Progressed/Changed HEP, detail:    [] Other detail:       Objective Information/Functional Measures/Assessment    Continued with treatment program per flow sheet, progressing to Hip ABD/Ext in standing for improving functional LE strength and standing endurance.  Pt required consistent breaks between all exercises and  Progress Note  The patient is being evaluated for syncope    Updates  Asked to revisit patient to discuss hx of seizure and use of Keppra with wife. Patient is being discharged today    Discussed starting low dose Keppra with the patients wife given prior hx of suspected seizure and recommendation to start [address] on previous hospital visitis. Patient found to have positive UTI on UA but NGTD on urine culture. She tells me she has difficulty getting him to take fluids. Discussed risks vs. Benefits and patient presentation and mutually agreed not to start [address] at this time.        Active Ambulatory Problems     Diagnosis Date Noted    AMS (altered mental status) 12/23/2019     Resolved Ambulatory Problems     Diagnosis Date Noted    No Resolved Ambulatory Problems     Past Medical History:   Diagnosis Date    Alzheimer disease ([facility] [address]     Heart murmur     Hyperlipidemia     Thyroid disease        Current Facility-Administered Medications:     cefTRIAXone (ROCEPHIN) 1 g IVPB in 50 mL D5W minibag, 1 g, Intravenous, Daily, Dru Hannon MD, Last Rate: 100 mL/hr at 10/15/20 1117, 1 g at 10/15/20 1117    aspirin EC tablet 81 mg, 81 mg, Oral, Daily, Dru Hannon MD, 81 mg at 10/15/20 2314    finasteride (PROSCAR) tablet 5 mg, 5 mg, Oral, Daily, Dru Hannon MD, 5 mg at 10/15/20 9554    levothyroxine (SYNTHROID) tablet 175 mcg, 175 mcg, Oral, Daily, Dru Hannon MD, 175 mcg at 10/15/20 0541    cetirizine (ZYRTEC) tablet 5 mg, 5 mg, Oral, Daily, Dru Hannon MD, 5 mg at 10/15/20 0850    therapeutic multivitamin-minerals 1 tablet, 1 tablet, Oral, Daily, Dru Hannon MD, 1 tablet at 10/15/20 0841    polyethylene glycol (GLYCOLAX) packet 17 g, 17 g, Oral, BID PRN, Dru Hannon MD    pravastatin (PRAVACHOL) tablet 40 mg, 40 mg, Oral, Nightly, Dru Hannon MD, 40 mg at 10/14/20 2154    Vitamin D (CHOLECALCIFEROL) tablet 5,000 Units, 5,000 Units, Oral, Daily, Olena Hernandez MD, 5,000 Units at 10/15/20 6979    sodium chloride flush 0.9 % injection 10 mL, 10 mL, Intravenous, 2 times per day, Olena Hernandez MD, 10 mL at 10/15/20 0849    sodium chloride flush 0.9 % injection 10 mL, 10 mL, Intravenous, PRN, Olena Hernandez MD    potassium chloride (KLOR-CON M) extended release tablet 40 mEq, 40 mEq, Oral, PRN **OR** potassium bicarb-citric acid (EFFER-K) effervescent tablet 40 mEq, 40 mEq, Oral, PRN **OR** potassium chloride 10 mEq/100 mL IVPB (Peripheral Line), 10 mEq, Intravenous, PRN, Olena Hernandez MD    acetaminophen (TYLENOL) tablet 650 mg, 650 mg, Oral, Q6H PRN, 650 mg at 10/13/20 2020 **OR** acetaminophen (TYLENOL) suppository 650 mg, 650 mg, Rectal, Q6H PRN, Olena Hernandez MD    polyethylene glycol [facility]) packet 17 g, 17 g, Oral, Daily PRN, Olena Hernandez MD    promethazine (PHENERGAN) tablet 12.5 mg, 12.5 mg, Oral, Q6H PRN **OR** ondansetron (ZOFRAN) injection 4 mg, 4 mg, Intravenous, Q6H PRN, Olena Hernandez MD    enoxaparin (LOVENOX) injection 40 mg, 40 mg, Subcutaneous, Nightly, Olena Hernandez MD, 40 mg at 10/14/20 2155    famotidine (PEPCID) tablet 20 mg, 20 mg, Oral, Nightly, Edwige Jimenez MD, 20 mg at 10/14/20 2154      ROS -   Limited Alzheimer's Dementia, non verbal at baseline.       cefTRIAXone (ROCEPHIN) IV  1 g Intravenous Daily    aspirin  81 mg Oral Daily    finasteride  5 mg Oral Daily    levothyroxine  175 mcg Oral Daily    cetirizine  5 mg Oral Daily    therapeutic multivitamin-minerals  1 tablet Oral Daily    pravastatin  40 mg Oral Nightly    Vitamin D  5,000 Units Oral Daily    sodium chloride flush  10 mL Intravenous 2 times per day    enoxaparin  40 mg Subcutaneous Nightly    famotidine  20 mg Oral Nightly             polyethylene glycol, sodium chloride flush, potassium chloride **OR** potassium alternative oral replacement **OR** potassium chloride, acetaminophen **OR** acetaminophen, polyethylene glycol, promethazine **OR** ondansetron     Exam:  Blood pressure 134/66, pulse 50, temperature 98.9 °F (37.2 °C), temperature source Oral, resp. rate 18, height 6' (1.829 m), weight 176 lb 9.4 oz (80.1 kg), SpO2 95 %. Constitutional               Vital signs: BP, HR, and RR reviewed            General Alert, no distress, well-nourished  Eyes: unable to visualize the fundi  Cardiovascular: pulses symmetric in all 4 extremities. No peripheral edema. Psychiatric: No psychotic behavior noted. Neurologic  Mental status:   orientation limited, non verbal r/t Alzheimer disease. General fund of knowledge:   limited, non verbal r/t Alzheimer disease. Memory:  limited, non verbal r/t Alzheimer disease. Attention  Appear alert. Will track and regard. Language  limited, non verbal r/t Alzheimer disease. Comprehension  limited, non verbal r/t Alzheimer disease. Does not follow commands. Cranial nerves:   CN2: Limited assessment. Visual Fields appear to full as patient blinks to threat bilaterally. CN 3,4,6: extraocular muscles intact. Pupillary light assessment limited given cooperation. CN5: V1: V2: V3: limited given static encephalopathy. CN7: upper and lower facial symmetric. Patient non verbal at baseline. CN8: limited given static encephalopathy. CN9/10: limited given static encephalopathy. CN11: limited given static encephalopathy. CN12: limited given static encephalopathy. Strength: limited given static encephalopathy. BUE: grossly 2/5. Purposeful movement but does not follow. BLE: Minimal distal movement observed. Sensory: deferred for comfort. Cerebellar/coordination: limited given static encephalopathy. Tone: increased throughout. Gait: deferred for safety. Labs  Na: 138  K: 3.9  BUN: 15  Creatinine: 1.0  Glucose: 90    WBC: 5.6    Covid 19: Not detected.      UA: large leukocyte esterase, negative for nitrites. >900 WBC  Urine Culture: NGTD 18-36hrs. Studies  CT Head w/o 10/12/20:  No hemorrhage or mass identified.   Atrophy and small-vessel ischemic change, similar to prior      CTA Head & Neck w/ 20:1. No acute intracranial abnormality. 2. No large vessel occlusion. 3. Severe chronic frontotemporal atrophy which can be seen with frontotemporal dementia.      Routine EEG 3/2019: IMPRESSION:   This EEG shows mild to moderate generalized slowing. There is no   evidence of epileptiform activities during the recording. Orthostatic BP   Sittin/72    Standin/70     Impression  1. Syncope  2. UTI  3. Alzheimer's Disease. 4.HX of vasovagal syncope     aYhir Sousa is a 80 y.o. male with history of alzheimer's dementia, heart murmur, HLD, thyroid disease, vasovagal syncope who presents with syncopal event, different from typical as had \"reported raling breathing sound\" UTI found on admission. Exam limited given end stage alzheimer's.      Etiology: Symptoms most consistent with vasovagal syncope vs. R/t orthostatic hypotension. Less likely seizure. Recommendations  - No further recommendations at this time. Can consider 500mg BID Keppra if spells persist in unprovoked manner (not with suspected vagal response or suspected orthostatic hypotension, or presents with  GTC type event). - The patient does not drive. Neurology will sign off. Please do not hesitate to call back with any questions or concerns.      Ramesh Labrum, [address] Neurology    A copy of this note was provided for Dr Ubaldo Elizabeth MD